# Patient Record
Sex: FEMALE | Race: WHITE | Employment: OTHER | URBAN - METROPOLITAN AREA
[De-identification: names, ages, dates, MRNs, and addresses within clinical notes are randomized per-mention and may not be internally consistent; named-entity substitution may affect disease eponyms.]

---

## 2017-01-23 PROBLEM — M48.061 SPINAL STENOSIS OF LUMBAR REGION: Status: ACTIVE | Noted: 2017-01-23

## 2017-01-23 PROBLEM — R10.9 LEFT FLANK PAIN: Status: ACTIVE | Noted: 2017-01-23

## 2017-01-23 PROBLEM — R10.2 SUPRAPUBIC DISCOMFORT: Status: ACTIVE | Noted: 2017-01-23

## 2017-01-24 PROCEDURE — 81001 URINALYSIS AUTO W/SCOPE: CPT | Performed by: FAMILY MEDICINE

## 2017-06-13 PROBLEM — R63.4 WEIGHT LOSS: Status: ACTIVE | Noted: 2017-06-13

## 2017-07-19 PROBLEM — M25.551 PAIN OF RIGHT HIP JOINT: Status: ACTIVE | Noted: 2017-07-19

## 2017-07-19 PROBLEM — S40.021A ARM BRUISE, RIGHT, INITIAL ENCOUNTER: Status: ACTIVE | Noted: 2017-07-19

## 2017-07-19 PROBLEM — R79.89 PRERENAL AZOTEMIA: Status: ACTIVE | Noted: 2017-07-19

## 2018-09-19 PROCEDURE — 81003 URINALYSIS AUTO W/O SCOPE: CPT | Performed by: FAMILY MEDICINE

## 2019-01-01 ENCOUNTER — APPOINTMENT (OUTPATIENT)
Dept: CT IMAGING | Facility: HOSPITAL | Age: 84
DRG: 813 | End: 2019-01-01
Attending: EMERGENCY MEDICINE
Payer: MEDICARE

## 2019-01-01 ENCOUNTER — HOSPITAL ENCOUNTER (OUTPATIENT)
Facility: HOSPITAL | Age: 84
Setting detail: OBSERVATION
Discharge: HOSPICE | End: 2019-01-01
Attending: EMERGENCY MEDICINE | Admitting: INTERNAL MEDICINE
Payer: MEDICARE

## 2019-01-01 ENCOUNTER — HOSPITAL ENCOUNTER (INPATIENT)
Facility: HOSPITAL | Age: 84
LOS: 12 days | Discharge: NURSING, REHAB OR LONG-TERM CARE | DRG: 813 | End: 2019-01-01
Attending: EMERGENCY MEDICINE | Admitting: HOSPITALIST
Payer: MEDICARE

## 2019-01-01 ENCOUNTER — HOSPITAL ENCOUNTER (INPATIENT)
Facility: HOSPITAL | Age: 84
LOS: 10 days | Discharge: HOSPICE | DRG: 813 | End: 2019-01-01
Attending: INTERNAL MEDICINE | Admitting: INTERNAL MEDICINE
Payer: OTHER MISCELLANEOUS

## 2019-01-01 VITALS
DIASTOLIC BLOOD PRESSURE: 65 MMHG | TEMPERATURE: 97 F | SYSTOLIC BLOOD PRESSURE: 124 MMHG | OXYGEN SATURATION: 98 % | RESPIRATION RATE: 18 BRPM | HEART RATE: 98 BPM

## 2019-01-01 VITALS
WEIGHT: 115 LBS | HEART RATE: 92 BPM | TEMPERATURE: 99 F | SYSTOLIC BLOOD PRESSURE: 119 MMHG | BODY MASS INDEX: 20.37 KG/M2 | DIASTOLIC BLOOD PRESSURE: 47 MMHG | OXYGEN SATURATION: 98 % | RESPIRATION RATE: 20 BRPM | HEIGHT: 63 IN

## 2019-01-01 VITALS
SYSTOLIC BLOOD PRESSURE: 110 MMHG | OXYGEN SATURATION: 99 % | BODY MASS INDEX: 20.58 KG/M2 | RESPIRATION RATE: 16 BRPM | HEIGHT: 61 IN | TEMPERATURE: 98 F | DIASTOLIC BLOOD PRESSURE: 48 MMHG | WEIGHT: 109 LBS | HEART RATE: 116 BPM

## 2019-01-01 DIAGNOSIS — D69.6 THROMBOCYTOPENIA (HCC): ICD-10-CM

## 2019-01-01 DIAGNOSIS — D69.6 THROMBOCYTOPENIA (HCC): Primary | ICD-10-CM

## 2019-01-01 DIAGNOSIS — D69.3 ACUTE ITP (HCC): Primary | ICD-10-CM

## 2019-01-01 LAB
ANION GAP SERPL CALC-SCNC: 10 MMOL/L (ref 0–18)
ANION GAP SERPL CALC-SCNC: 11 MMOL/L (ref 0–18)
ANION GAP SERPL CALC-SCNC: 6 MMOL/L (ref 0–18)
ANION GAP SERPL CALC-SCNC: 6 MMOL/L (ref 0–18)
ANION GAP SERPL CALC-SCNC: 7 MMOL/L (ref 0–18)
ANION GAP SERPL CALC-SCNC: 8 MMOL/L (ref 0–18)
ANION GAP SERPL CALC-SCNC: 8 MMOL/L (ref 0–18)
ANION GAP SERPL CALC-SCNC: 9 MMOL/L (ref 0–18)
ANTIBODY SCREEN: NEGATIVE
ANTIBODY SCREEN: POSITIVE
ANTIBODY SCREEN: POSITIVE
BACTERIA UR QL AUTO: NEGATIVE /HPF
BASOPHILS # BLD AUTO: 0.01 X10(3) UL (ref 0–0.2)
BASOPHILS # BLD AUTO: 0.03 X10(3) UL (ref 0–0.2)
BASOPHILS # BLD: 0 X10(3) UL (ref 0–0.2)
BASOPHILS NFR BLD AUTO: 0.1 %
BASOPHILS NFR BLD AUTO: 0.2 %
BASOPHILS NFR BLD: 0 %
BILIRUB UR QL: NEGATIVE
BLOOD TYPE BARCODE: 5100
BLOOD TYPE BARCODE: 6200
BLOOD TYPE BARCODE: 9500
BLOOD TYPE BARCODE: 9500
BUN BLD-MCNC: 24 MG/DL (ref 7–18)
BUN BLD-MCNC: 28 MG/DL (ref 7–18)
BUN BLD-MCNC: 31 MG/DL (ref 7–18)
BUN BLD-MCNC: 37 MG/DL (ref 7–18)
BUN BLD-MCNC: 42 MG/DL (ref 7–18)
BUN BLD-MCNC: 43 MG/DL (ref 7–18)
BUN BLD-MCNC: 43 MG/DL (ref 7–18)
BUN BLD-MCNC: 45 MG/DL (ref 7–18)
BUN BLD-MCNC: 47 MG/DL (ref 7–18)
BUN BLD-MCNC: 47 MG/DL (ref 7–18)
BUN BLD-MCNC: 54 MG/DL (ref 7–18)
BUN/CREAT SERPL: 24.2 (ref 10–20)
BUN/CREAT SERPL: 27.2 (ref 10–20)
BUN/CREAT SERPL: 31.6 (ref 10–20)
BUN/CREAT SERPL: 32.7 (ref 10–20)
BUN/CREAT SERPL: 40.8 (ref 10–20)
BUN/CREAT SERPL: 42.9 (ref 10–20)
BUN/CREAT SERPL: 43.9 (ref 10–20)
BUN/CREAT SERPL: 44.3 (ref 10–20)
BUN/CREAT SERPL: 44.3 (ref 10–20)
BUN/CREAT SERPL: 46.1 (ref 10–20)
BUN/CREAT SERPL: 48.6 (ref 10–20)
CALCIUM BLD-MCNC: 8.6 MG/DL (ref 8.5–10.1)
CALCIUM BLD-MCNC: 8.9 MG/DL (ref 8.5–10.1)
CALCIUM BLD-MCNC: 8.9 MG/DL (ref 8.5–10.1)
CALCIUM BLD-MCNC: 9 MG/DL (ref 8.5–10.1)
CALCIUM BLD-MCNC: 9.1 MG/DL (ref 8.5–10.1)
CALCIUM BLD-MCNC: 9.2 MG/DL (ref 8.5–10.1)
CALCIUM BLD-MCNC: 9.3 MG/DL (ref 8.5–10.1)
CALCIUM BLD-MCNC: 9.4 MG/DL (ref 8.5–10.1)
CALCIUM BLD-MCNC: 9.5 MG/DL (ref 8.5–10.1)
CHLORIDE SERPL-SCNC: 108 MMOL/L (ref 98–112)
CHLORIDE SERPL-SCNC: 109 MMOL/L (ref 98–112)
CHLORIDE SERPL-SCNC: 110 MMOL/L (ref 98–112)
CLARITY UR: CLEAR
CO2 SERPL-SCNC: 23 MMOL/L (ref 21–32)
CO2 SERPL-SCNC: 23 MMOL/L (ref 21–32)
CO2 SERPL-SCNC: 24 MMOL/L (ref 21–32)
CO2 SERPL-SCNC: 25 MMOL/L (ref 21–32)
CO2 SERPL-SCNC: 26 MMOL/L (ref 21–32)
CO2 SERPL-SCNC: 26 MMOL/L (ref 21–32)
CO2 SERPL-SCNC: 27 MMOL/L (ref 21–32)
COLOR UR: YELLOW
CREAT BLD-MCNC: 0.97 MG/DL (ref 0.55–1.02)
CREAT BLD-MCNC: 0.98 MG/DL (ref 0.55–1.02)
CREAT BLD-MCNC: 0.98 MG/DL (ref 0.55–1.02)
CREAT BLD-MCNC: 0.99 MG/DL (ref 0.55–1.02)
CREAT BLD-MCNC: 1.02 MG/DL (ref 0.55–1.02)
CREAT BLD-MCNC: 1.03 MG/DL (ref 0.55–1.02)
CREAT BLD-MCNC: 1.03 MG/DL (ref 0.55–1.02)
CREAT BLD-MCNC: 1.05 MG/DL (ref 0.55–1.02)
CREAT BLD-MCNC: 1.06 MG/DL (ref 0.55–1.02)
CREAT BLD-MCNC: 1.11 MG/DL (ref 0.55–1.02)
CREAT BLD-MCNC: 1.13 MG/DL (ref 0.55–1.02)
DAT (C3D): NEGATIVE
DAT (C3D): NEGATIVE
DAT (IGG): POSITIVE
DAT (IGG): POSITIVE
DEPRECATED RDW RBC AUTO: 45.9 FL (ref 35.1–46.3)
DEPRECATED RDW RBC AUTO: 46 FL (ref 35.1–46.3)
DEPRECATED RDW RBC AUTO: 46.5 FL (ref 35.1–46.3)
DEPRECATED RDW RBC AUTO: 46.9 FL (ref 35.1–46.3)
DEPRECATED RDW RBC AUTO: 55.6 FL (ref 35.1–46.3)
DEPRECATED RDW RBC AUTO: 55.7 FL (ref 35.1–46.3)
DEPRECATED RDW RBC AUTO: 56.8 FL (ref 35.1–46.3)
DEPRECATED RDW RBC AUTO: 56.9 FL (ref 35.1–46.3)
DEPRECATED RDW RBC AUTO: 57.1 FL (ref 35.1–46.3)
DEPRECATED RDW RBC AUTO: 57.5 FL (ref 35.1–46.3)
DEPRECATED RDW RBC AUTO: 57.5 FL (ref 35.1–46.3)
DEPRECATED RDW RBC AUTO: 57.6 FL (ref 35.1–46.3)
DEPRECATED RDW RBC AUTO: 58.3 FL (ref 35.1–46.3)
DEPRECATED RDW RBC AUTO: 58.5 FL (ref 35.1–46.3)
DEPRECATED RDW RBC AUTO: 59.9 FL (ref 35.1–46.3)
DEPRECATED RDW RBC AUTO: 66.3 FL (ref 35.1–46.3)
DEPRECATED RDW RBC AUTO: 68.2 FL (ref 35.1–46.3)
DIRECT COOMBS POLY: POSITIVE
DIRECT COOMBS POLY: POSITIVE
EOSINOPHIL # BLD AUTO: 0 X10(3) UL (ref 0–0.7)
EOSINOPHIL # BLD AUTO: 0.01 X10(3) UL (ref 0–0.7)
EOSINOPHIL # BLD AUTO: 0.01 X10(3) UL (ref 0–0.7)
EOSINOPHIL # BLD AUTO: 0.05 X10(3) UL (ref 0–0.7)
EOSINOPHIL # BLD: 0 X10(3) UL (ref 0–0.7)
EOSINOPHIL NFR BLD AUTO: 0 %
EOSINOPHIL NFR BLD AUTO: 0.1 %
EOSINOPHIL NFR BLD AUTO: 0.1 %
EOSINOPHIL NFR BLD AUTO: 0.6 %
EOSINOPHIL NFR BLD: 0 %
ERYTHROCYTE [DISTWIDTH] IN BLOOD BY AUTOMATED COUNT: 12.3 % (ref 11–15)
ERYTHROCYTE [DISTWIDTH] IN BLOOD BY AUTOMATED COUNT: 12.4 % (ref 11–15)
ERYTHROCYTE [DISTWIDTH] IN BLOOD BY AUTOMATED COUNT: 12.4 % (ref 11–15)
ERYTHROCYTE [DISTWIDTH] IN BLOOD BY AUTOMATED COUNT: 12.5 % (ref 11–15)
ERYTHROCYTE [DISTWIDTH] IN BLOOD BY AUTOMATED COUNT: 15.9 % (ref 11–15)
ERYTHROCYTE [DISTWIDTH] IN BLOOD BY AUTOMATED COUNT: 15.9 % (ref 11–15)
ERYTHROCYTE [DISTWIDTH] IN BLOOD BY AUTOMATED COUNT: 16 % (ref 11–15)
ERYTHROCYTE [DISTWIDTH] IN BLOOD BY AUTOMATED COUNT: 16.1 % (ref 11–15)
ERYTHROCYTE [DISTWIDTH] IN BLOOD BY AUTOMATED COUNT: 16.3 % (ref 11–15)
ERYTHROCYTE [DISTWIDTH] IN BLOOD BY AUTOMATED COUNT: 16.4 % (ref 11–15)
ERYTHROCYTE [DISTWIDTH] IN BLOOD BY AUTOMATED COUNT: 16.5 % (ref 11–15)
ERYTHROCYTE [DISTWIDTH] IN BLOOD BY AUTOMATED COUNT: 17.2 % (ref 11–15)
ERYTHROCYTE [DISTWIDTH] IN BLOOD BY AUTOMATED COUNT: 17.3 % (ref 11–15)
ERYTHROCYTE [DISTWIDTH] IN BLOOD BY AUTOMATED COUNT: 17.9 % (ref 11–15)
ERYTHROCYTE [DISTWIDTH] IN BLOOD BY AUTOMATED COUNT: 18.2 % (ref 11–15)
GLUCOSE BLD-MCNC: 122 MG/DL (ref 70–99)
GLUCOSE BLD-MCNC: 131 MG/DL (ref 70–99)
GLUCOSE BLD-MCNC: 132 MG/DL (ref 70–99)
GLUCOSE BLD-MCNC: 134 MG/DL (ref 70–99)
GLUCOSE BLD-MCNC: 134 MG/DL (ref 70–99)
GLUCOSE BLD-MCNC: 138 MG/DL (ref 70–99)
GLUCOSE BLD-MCNC: 140 MG/DL (ref 70–99)
GLUCOSE BLD-MCNC: 143 MG/DL (ref 70–99)
GLUCOSE BLD-MCNC: 152 MG/DL (ref 70–99)
GLUCOSE BLD-MCNC: 158 MG/DL (ref 70–99)
GLUCOSE BLD-MCNC: 96 MG/DL (ref 70–99)
GLUCOSE UR-MCNC: NEGATIVE MG/DL
HAPTOGLOB SERPL-MCNC: 177 MG/DL (ref 30–200)
HAPTOGLOB SERPL-MCNC: 190 MG/DL (ref 30–200)
HAV IGM SER QL: 2.3 MG/DL (ref 1.6–2.6)
HAV IGM SER QL: 2.4 MG/DL (ref 1.6–2.6)
HAV IGM SER QL: 2.5 MG/DL (ref 1.6–2.6)
HCT VFR BLD AUTO: 23 % (ref 35–48)
HCT VFR BLD AUTO: 23.3 % (ref 35–48)
HCT VFR BLD AUTO: 24.2 % (ref 35–48)
HCT VFR BLD AUTO: 24.4 % (ref 35–48)
HCT VFR BLD AUTO: 24.7 % (ref 35–48)
HCT VFR BLD AUTO: 24.7 % (ref 35–48)
HCT VFR BLD AUTO: 24.9 % (ref 35–48)
HCT VFR BLD AUTO: 25.2 % (ref 35–48)
HCT VFR BLD AUTO: 25.2 % (ref 35–48)
HCT VFR BLD AUTO: 25.6 % (ref 35–48)
HCT VFR BLD AUTO: 26 % (ref 35–48)
HCT VFR BLD AUTO: 26.5 % (ref 35–48)
HCT VFR BLD AUTO: 26.9 % (ref 35–48)
HCT VFR BLD AUTO: 27.1 % (ref 35–48)
HCT VFR BLD AUTO: 27.2 % (ref 35–48)
HCT VFR BLD AUTO: 27.4 % (ref 35–48)
HCT VFR BLD AUTO: 31.6 % (ref 35–48)
HGB BLD-MCNC: 10.6 G/DL (ref 12–16)
HGB BLD-MCNC: 7.4 G/DL (ref 12–16)
HGB BLD-MCNC: 7.6 G/DL (ref 12–16)
HGB BLD-MCNC: 7.8 G/DL (ref 12–16)
HGB BLD-MCNC: 7.9 G/DL (ref 12–16)
HGB BLD-MCNC: 8 G/DL (ref 12–16)
HGB BLD-MCNC: 8.2 G/DL (ref 12–16)
HGB BLD-MCNC: 8.2 G/DL (ref 12–16)
HGB BLD-MCNC: 8.3 G/DL (ref 12–16)
HGB BLD-MCNC: 8.3 G/DL (ref 12–16)
HGB BLD-MCNC: 8.4 G/DL (ref 12–16)
HGB BLD-MCNC: 8.5 G/DL (ref 12–16)
HGB BLD-MCNC: 8.7 G/DL (ref 12–16)
HGB BLD-MCNC: 8.9 G/DL (ref 12–16)
HGB BLD-MCNC: 9 G/DL (ref 12–16)
HGB UR QL STRIP.AUTO: NEGATIVE
IMM GRANULOCYTES # BLD AUTO: 0.05 X10(3) UL (ref 0–1)
IMM GRANULOCYTES # BLD AUTO: 0.05 X10(3) UL (ref 0–1)
IMM GRANULOCYTES # BLD AUTO: 0.09 X10(3) UL (ref 0–1)
IMM GRANULOCYTES # BLD AUTO: 0.14 X10(3) UL (ref 0–1)
IMM GRANULOCYTES # BLD AUTO: 0.25 X10(3) UL (ref 0–1)
IMM GRANULOCYTES # BLD AUTO: 0.37 X10(3) UL (ref 0–1)
IMM GRANULOCYTES NFR BLD: 0.6 %
IMM GRANULOCYTES NFR BLD: 0.7 %
IMM GRANULOCYTES NFR BLD: 0.9 %
IMM GRANULOCYTES NFR BLD: 1.4 %
IMM GRANULOCYTES NFR BLD: 1.6 %
IMM GRANULOCYTES NFR BLD: 2 %
KETONES UR-MCNC: NEGATIVE MG/DL
LDH SERPL L TO P-CCNC: 227 U/L (ref 84–246)
LDH SERPL L TO P-CCNC: 232 U/L (ref 84–246)
LYMPHOCYTES # BLD AUTO: 0.53 X10(3) UL (ref 1–4)
LYMPHOCYTES # BLD AUTO: 0.68 X10(3) UL (ref 1–4)
LYMPHOCYTES # BLD AUTO: 0.74 X10(3) UL (ref 1–4)
LYMPHOCYTES # BLD AUTO: 0.88 X10(3) UL (ref 1–4)
LYMPHOCYTES # BLD AUTO: 1.19 X10(3) UL (ref 1–4)
LYMPHOCYTES # BLD AUTO: 1.28 X10(3) UL (ref 1–4)
LYMPHOCYTES NFR BLD AUTO: 12.3 %
LYMPHOCYTES NFR BLD AUTO: 14.9 %
LYMPHOCYTES NFR BLD AUTO: 4.6 %
LYMPHOCYTES NFR BLD AUTO: 4.9 %
LYMPHOCYTES NFR BLD AUTO: 5.4 %
LYMPHOCYTES NFR BLD AUTO: 8.9 %
LYMPHOCYTES NFR BLD: 0.9 X10(3) UL (ref 1–4)
LYMPHOCYTES NFR BLD: 0.91 X10(3) UL (ref 1–4)
LYMPHOCYTES NFR BLD: 1.05 X10(3) UL (ref 1–4)
LYMPHOCYTES NFR BLD: 1.44 X10(3) UL (ref 1–4)
LYMPHOCYTES NFR BLD: 1.55 X10(3) UL (ref 1–4)
LYMPHOCYTES NFR BLD: 4 %
LYMPHOCYTES NFR BLD: 4 %
LYMPHOCYTES NFR BLD: 5 %
LYMPHOCYTES NFR BLD: 6 %
LYMPHOCYTES NFR BLD: 7 %
MCH RBC QN AUTO: 31.8 PG (ref 26–34)
MCH RBC QN AUTO: 31.9 PG (ref 26–34)
MCH RBC QN AUTO: 32 PG (ref 26–34)
MCH RBC QN AUTO: 32.2 PG (ref 26–34)
MCH RBC QN AUTO: 32.4 PG (ref 26–34)
MCH RBC QN AUTO: 32.5 PG (ref 26–34)
MCH RBC QN AUTO: 32.9 PG (ref 26–34)
MCH RBC QN AUTO: 33.2 PG (ref 26–34)
MCH RBC QN AUTO: 33.3 PG (ref 26–34)
MCH RBC QN AUTO: 33.3 PG (ref 26–34)
MCH RBC QN AUTO: 33.5 PG (ref 26–34)
MCH RBC QN AUTO: 33.5 PG (ref 26–34)
MCHC RBC AUTO-ENTMCNC: 31.4 G/DL (ref 31–37)
MCHC RBC AUTO-ENTMCNC: 32 G/DL (ref 31–37)
MCHC RBC AUTO-ENTMCNC: 32.2 G/DL (ref 31–37)
MCHC RBC AUTO-ENTMCNC: 32.3 G/DL (ref 31–37)
MCHC RBC AUTO-ENTMCNC: 32.4 G/DL (ref 31–37)
MCHC RBC AUTO-ENTMCNC: 32.4 G/DL (ref 31–37)
MCHC RBC AUTO-ENTMCNC: 32.5 G/DL (ref 31–37)
MCHC RBC AUTO-ENTMCNC: 32.8 G/DL (ref 31–37)
MCHC RBC AUTO-ENTMCNC: 32.8 G/DL (ref 31–37)
MCHC RBC AUTO-ENTMCNC: 32.9 G/DL (ref 31–37)
MCHC RBC AUTO-ENTMCNC: 32.9 G/DL (ref 31–37)
MCHC RBC AUTO-ENTMCNC: 33.1 G/DL (ref 31–37)
MCHC RBC AUTO-ENTMCNC: 33.1 G/DL (ref 31–37)
MCHC RBC AUTO-ENTMCNC: 33.5 G/DL (ref 31–37)
MCHC RBC AUTO-ENTMCNC: 33.5 G/DL (ref 31–37)
MCHC RBC AUTO-ENTMCNC: 33.6 G/DL (ref 31–37)
MCHC RBC AUTO-ENTMCNC: 33.7 G/DL (ref 31–37)
MCV RBC AUTO: 101.5 FL (ref 80–100)
MCV RBC AUTO: 101.6 FL (ref 80–100)
MCV RBC AUTO: 102.5 FL (ref 80–100)
MCV RBC AUTO: 102.5 FL (ref 80–100)
MCV RBC AUTO: 103.6 FL (ref 80–100)
MCV RBC AUTO: 104.1 FL (ref 80–100)
MCV RBC AUTO: 96.6 FL (ref 80–100)
MCV RBC AUTO: 96.8 FL (ref 80–100)
MCV RBC AUTO: 96.9 FL (ref 80–100)
MCV RBC AUTO: 97.1 FL (ref 80–100)
MCV RBC AUTO: 97.8 FL (ref 80–100)
MCV RBC AUTO: 98.1 FL (ref 80–100)
MCV RBC AUTO: 98.1 FL (ref 80–100)
MCV RBC AUTO: 98.2 FL (ref 80–100)
MCV RBC AUTO: 98.6 FL (ref 80–100)
MCV RBC AUTO: 98.8 FL (ref 80–100)
MCV RBC AUTO: 99.2 FL (ref 80–100)
METAMYELOCYTES # BLD: 0.53 X10(3) UL
METAMYELOCYTES NFR BLD: 2 %
MONOCYTES # BLD AUTO: 0.1 X10(3) UL (ref 0.1–1)
MONOCYTES # BLD AUTO: 0.35 X10(3) UL (ref 0.1–1)
MONOCYTES # BLD AUTO: 0.36 X10(3) UL (ref 0.1–1)
MONOCYTES # BLD AUTO: 0.53 X10(3) UL (ref 0.1–1)
MONOCYTES # BLD AUTO: 0.55 X10(3) UL (ref 0.1–1)
MONOCYTES # BLD AUTO: 0.69 X10(3) UL (ref 0.1–1)
MONOCYTES # BLD: 0.23 X10(3) UL (ref 0.1–1)
MONOCYTES # BLD: 0.54 X10(3) UL (ref 0.1–1)
MONOCYTES # BLD: 0.55 X10(3) UL (ref 0.1–1)
MONOCYTES # BLD: 1.04 X10(3) UL (ref 0.1–1)
MONOCYTES # BLD: 1.58 X10(3) UL (ref 0.1–1)
MONOCYTES NFR BLD AUTO: 1 %
MONOCYTES NFR BLD AUTO: 2 %
MONOCYTES NFR BLD AUTO: 3.4 %
MONOCYTES NFR BLD AUTO: 4.1 %
MONOCYTES NFR BLD AUTO: 6.9 %
MONOCYTES NFR BLD AUTO: 7.1 %
MONOCYTES NFR BLD: 1 %
MONOCYTES NFR BLD: 3 %
MONOCYTES NFR BLD: 3 %
MONOCYTES NFR BLD: 4 %
MONOCYTES NFR BLD: 6 %
MORPHOLOGY: NORMAL
MRSA DNA SPEC QL NAA+PROBE: NEGATIVE
MYELOCYTES # BLD: 0.26 X10(3) UL
MYELOCYTES NFR BLD: 1 %
NEUTROPHILS # BLD AUTO: 14.53 X10 (3) UL (ref 1.5–7.7)
NEUTROPHILS # BLD AUTO: 14.53 X10(3) UL (ref 1.5–7.7)
NEUTROPHILS # BLD AUTO: 15.5 X10 (3) UL (ref 1.5–7.7)
NEUTROPHILS # BLD AUTO: 15.61 X10 (3) UL (ref 1.5–7.7)
NEUTROPHILS # BLD AUTO: 16.42 X10 (3) UL (ref 1.5–7.7)
NEUTROPHILS # BLD AUTO: 16.42 X10(3) UL (ref 1.5–7.7)
NEUTROPHILS # BLD AUTO: 18.78 X10 (3) UL (ref 1.5–7.7)
NEUTROPHILS # BLD AUTO: 21.27 X10 (3) UL (ref 1.5–7.7)
NEUTROPHILS # BLD AUTO: 22.13 X10 (3) UL (ref 1.5–7.7)
NEUTROPHILS # BLD AUTO: 6.37 X10 (3) UL (ref 1.5–7.7)
NEUTROPHILS # BLD AUTO: 6.37 X10(3) UL (ref 1.5–7.7)
NEUTROPHILS # BLD AUTO: 6.84 X10 (3) UL (ref 1.5–7.7)
NEUTROPHILS # BLD AUTO: 6.84 X10(3) UL (ref 1.5–7.7)
NEUTROPHILS # BLD AUTO: 7.72 X10 (3) UL (ref 1.5–7.7)
NEUTROPHILS # BLD AUTO: 7.72 X10(3) UL (ref 1.5–7.7)
NEUTROPHILS # BLD AUTO: 9.08 X10 (3) UL (ref 1.5–7.7)
NEUTROPHILS # BLD AUTO: 9.08 X10(3) UL (ref 1.5–7.7)
NEUTROPHILS NFR BLD AUTO: 79.5 %
NEUTROPHILS NFR BLD AUTO: 79.7 %
NEUTROPHILS NFR BLD AUTO: 83.3 %
NEUTROPHILS NFR BLD AUTO: 90.3 %
NEUTROPHILS NFR BLD AUTO: 90.9 %
NEUTROPHILS NFR BLD AUTO: 92.1 %
NEUTROPHILS NFR BLD: 86 %
NEUTROPHILS NFR BLD: 87 %
NEUTROPHILS NFR BLD: 88 %
NEUTROPHILS NFR BLD: 91 %
NEUTROPHILS NFR BLD: 94 %
NEUTS BAND NFR BLD: 1 %
NEUTS BAND NFR BLD: 3 %
NEUTS HYPERSEG # BLD: 16.02 X10(3) UL (ref 1.5–7.7)
NEUTS HYPERSEG # BLD: 16.74 X10(3) UL (ref 1.5–7.7)
NEUTS HYPERSEG # BLD: 21.47 X10(3) UL (ref 1.5–7.7)
NEUTS HYPERSEG # BLD: 22.88 X10(3) UL (ref 1.5–7.7)
NEUTS HYPERSEG # BLD: 23.31 X10(3) UL (ref 1.5–7.7)
NITRITE UR QL STRIP.AUTO: NEGATIVE
OSMOLALITY SERPL CALC.SUM OF ELEC: 300 MOSM/KG (ref 275–295)
OSMOLALITY SERPL CALC.SUM OF ELEC: 300 MOSM/KG (ref 275–295)
OSMOLALITY SERPL CALC.SUM OF ELEC: 303 MOSM/KG (ref 275–295)
OSMOLALITY SERPL CALC.SUM OF ELEC: 303 MOSM/KG (ref 275–295)
OSMOLALITY SERPL CALC.SUM OF ELEC: 305 MOSM/KG (ref 275–295)
OSMOLALITY SERPL CALC.SUM OF ELEC: 307 MOSM/KG (ref 275–295)
OSMOLALITY SERPL CALC.SUM OF ELEC: 309 MOSM/KG (ref 275–295)
OSMOLALITY SERPL CALC.SUM OF ELEC: 310 MOSM/KG (ref 275–295)
OSMOLALITY SERPL CALC.SUM OF ELEC: 311 MOSM/KG (ref 275–295)
PH UR: 5 [PH] (ref 5–8)
PLATELET # BLD AUTO: 100 10(3)UL (ref 150–450)
PLATELET # BLD AUTO: 101 10(3)UL (ref 150–450)
PLATELET # BLD AUTO: 11 10(3)UL (ref 150–450)
PLATELET # BLD AUTO: 12 10(3)UL (ref 150–450)
PLATELET # BLD AUTO: 144 10(3)UL (ref 150–450)
PLATELET # BLD AUTO: 15 10(3)UL (ref 150–450)
PLATELET # BLD AUTO: 17 10(3)UL (ref 150–450)
PLATELET # BLD AUTO: 172 10(3)UL (ref 150–450)
PLATELET # BLD AUTO: 2 10(3)UL (ref 150–450)
PLATELET # BLD AUTO: 20 10(3)UL (ref 150–450)
PLATELET # BLD AUTO: 24 10(3)UL (ref 150–450)
PLATELET # BLD AUTO: 27 10(3)UL (ref 150–450)
PLATELET # BLD AUTO: 29 10(3)UL (ref 150–450)
PLATELET # BLD AUTO: 4 10(3)UL (ref 150–450)
PLATELET # BLD AUTO: 64 10(3)UL (ref 150–450)
PLATELET # BLD AUTO: 65 10(3)UL (ref 150–450)
PLATELET # BLD AUTO: 7 10(3)UL (ref 150–450)
PLATELET # BLD AUTO: 8 10(3)UL (ref 150–450)
PLATELET # BLD AUTO: <2 10(3)UL (ref 150–450)
PLATELET # BLD AUTO: <2 10(3)UL (ref 150–450)
PLATELET MORPHOLOGY: NORMAL
POTASSIUM SERPL-SCNC: 3.7 MMOL/L (ref 3.5–5.1)
POTASSIUM SERPL-SCNC: 3.7 MMOL/L (ref 3.5–5.1)
POTASSIUM SERPL-SCNC: 3.8 MMOL/L (ref 3.5–5.1)
POTASSIUM SERPL-SCNC: 3.9 MMOL/L (ref 3.5–5.1)
POTASSIUM SERPL-SCNC: 4.2 MMOL/L (ref 3.5–5.1)
POTASSIUM SERPL-SCNC: 4.2 MMOL/L (ref 3.5–5.1)
POTASSIUM SERPL-SCNC: 4.3 MMOL/L (ref 3.5–5.1)
POTASSIUM SERPL-SCNC: 4.3 MMOL/L (ref 3.5–5.1)
POTASSIUM SERPL-SCNC: 4.8 MMOL/L (ref 3.5–5.1)
PROT UR-MCNC: NEGATIVE MG/DL
RBC # BLD AUTO: 2.21 X10(6)UL (ref 3.8–5.3)
RBC # BLD AUTO: 2.36 X10(6)UL (ref 3.8–5.3)
RBC # BLD AUTO: 2.4 X10(6)UL (ref 3.8–5.3)
RBC # BLD AUTO: 2.41 X10(6)UL (ref 3.8–5.3)
RBC # BLD AUTO: 2.43 X10(6)UL (ref 3.8–5.3)
RBC # BLD AUTO: 2.51 X10(6)UL (ref 3.8–5.3)
RBC # BLD AUTO: 2.52 X10(6)UL (ref 3.8–5.3)
RBC # BLD AUTO: 2.55 X10(6)UL (ref 3.8–5.3)
RBC # BLD AUTO: 2.57 X10(6)UL (ref 3.8–5.3)
RBC # BLD AUTO: 2.58 X10(6)UL (ref 3.8–5.3)
RBC # BLD AUTO: 2.61 X10(6)UL (ref 3.8–5.3)
RBC # BLD AUTO: 2.67 X10(6)UL (ref 3.8–5.3)
RBC # BLD AUTO: 2.7 X10(6)UL (ref 3.8–5.3)
RBC # BLD AUTO: 2.74 X10(6)UL (ref 3.8–5.3)
RBC # BLD AUTO: 2.78 X10(6)UL (ref 3.8–5.3)
RBC # BLD AUTO: 2.78 X10(6)UL (ref 3.8–5.3)
RBC # BLD AUTO: 3.22 X10(6)UL (ref 3.8–5.3)
RBC #/AREA URNS AUTO: 2 /HPF
RH BLOOD TYPE: POSITIVE
SODIUM SERPL-SCNC: 141 MMOL/L (ref 136–145)
SODIUM SERPL-SCNC: 142 MMOL/L (ref 136–145)
SODIUM SERPL-SCNC: 143 MMOL/L (ref 136–145)
SODIUM SERPL-SCNC: 143 MMOL/L (ref 136–145)
SODIUM SERPL-SCNC: 144 MMOL/L (ref 136–145)
SP GR UR STRIP: 1.02 (ref 1–1.03)
TOTAL CELLS COUNTED: 100
UROBILINOGEN UR STRIP-ACNC: 4
VARIANT LYMPHS NFR BLD MANUAL: 1 %
VIT C UR-MCNC: NEGATIVE MG/DL
WBC # BLD AUTO: 14.8 X10(3) UL (ref 4–11)
WBC # BLD AUTO: 15.8 X10(3) UL (ref 4–11)
WBC # BLD AUTO: 15.8 X10(3) UL (ref 4–11)
WBC # BLD AUTO: 16.1 X10(3) UL (ref 4–11)
WBC # BLD AUTO: 16.1 X10(3) UL (ref 4–11)
WBC # BLD AUTO: 18 X10(3) UL (ref 4–11)
WBC # BLD AUTO: 18.1 X10(3) UL (ref 4–11)
WBC # BLD AUTO: 18.2 X10(3) UL (ref 4–11)
WBC # BLD AUTO: 22.6 X10(3) UL (ref 4–11)
WBC # BLD AUTO: 25.9 X10(3) UL (ref 4–11)
WBC # BLD AUTO: 26.3 X10(3) UL (ref 4–11)
WBC # BLD AUTO: 7.5 X10(3) UL (ref 4–11)
WBC # BLD AUTO: 7.7 X10(3) UL (ref 4–11)
WBC # BLD AUTO: 7.7 X10(3) UL (ref 4–11)
WBC # BLD AUTO: 8.6 X10(3) UL (ref 4–11)
WBC # BLD AUTO: 9.7 X10(3) UL (ref 4–11)
WBC # BLD AUTO: 9.9 X10(3) UL (ref 4–11)
WBC #/AREA URNS AUTO: 8 /HPF

## 2019-01-01 PROCEDURE — 96374 THER/PROPH/DIAG INJ IV PUSH: CPT

## 2019-01-01 PROCEDURE — 85027 COMPLETE CBC AUTOMATED: CPT | Performed by: INTERNAL MEDICINE

## 2019-01-01 PROCEDURE — 80048 BASIC METABOLIC PNL TOTAL CA: CPT | Performed by: HOSPITALIST

## 2019-01-01 PROCEDURE — 85027 COMPLETE CBC AUTOMATED: CPT | Performed by: HOSPITALIST

## 2019-01-01 PROCEDURE — 86850 RBC ANTIBODY SCREEN: CPT

## 2019-01-01 PROCEDURE — 97166 OT EVAL MOD COMPLEX 45 MIN: CPT

## 2019-01-01 PROCEDURE — 85025 COMPLETE CBC W/AUTO DIFF WBC: CPT | Performed by: INTERNAL MEDICINE

## 2019-01-01 PROCEDURE — 86900 BLOOD TYPING SEROLOGIC ABO: CPT | Performed by: EMERGENCY MEDICINE

## 2019-01-01 PROCEDURE — 85007 BL SMEAR W/DIFF WBC COUNT: CPT | Performed by: HOSPITALIST

## 2019-01-01 PROCEDURE — 85025 COMPLETE CBC W/AUTO DIFF WBC: CPT | Performed by: EMERGENCY MEDICINE

## 2019-01-01 PROCEDURE — 80048 BASIC METABOLIC PNL TOTAL CA: CPT | Performed by: INTERNAL MEDICINE

## 2019-01-01 PROCEDURE — 86880 COOMBS TEST DIRECT: CPT | Performed by: INTERNAL MEDICINE

## 2019-01-01 PROCEDURE — 36415 COLL VENOUS BLD VENIPUNCTURE: CPT

## 2019-01-01 PROCEDURE — 87641 MR-STAPH DNA AMP PROBE: CPT | Performed by: EMERGENCY MEDICINE

## 2019-01-01 PROCEDURE — 86901 BLOOD TYPING SEROLOGIC RH(D): CPT | Performed by: INTERNAL MEDICINE

## 2019-01-01 PROCEDURE — 99285 EMERGENCY DEPT VISIT HI MDM: CPT

## 2019-01-01 PROCEDURE — 85049 AUTOMATED PLATELET COUNT: CPT | Performed by: INTERNAL MEDICINE

## 2019-01-01 PROCEDURE — 85025 COMPLETE CBC W/AUTO DIFF WBC: CPT | Performed by: HOSPITALIST

## 2019-01-01 PROCEDURE — 86850 RBC ANTIBODY SCREEN: CPT | Performed by: EMERGENCY MEDICINE

## 2019-01-01 PROCEDURE — 36430 TRANSFUSION BLD/BLD COMPNT: CPT

## 2019-01-01 PROCEDURE — 81001 URINALYSIS AUTO W/SCOPE: CPT | Performed by: FAMILY MEDICINE

## 2019-01-01 PROCEDURE — 86077 PHYS BLOOD BANK SERV XMATCH: CPT | Performed by: INTERNAL MEDICINE

## 2019-01-01 PROCEDURE — 97535 SELF CARE MNGMENT TRAINING: CPT

## 2019-01-01 PROCEDURE — 86901 BLOOD TYPING SEROLOGIC RH(D): CPT | Performed by: EMERGENCY MEDICINE

## 2019-01-01 PROCEDURE — 80048 BASIC METABOLIC PNL TOTAL CA: CPT | Performed by: EMERGENCY MEDICINE

## 2019-01-01 PROCEDURE — 70450 CT HEAD/BRAIN W/O DYE: CPT | Performed by: EMERGENCY MEDICINE

## 2019-01-01 PROCEDURE — 86850 RBC ANTIBODY SCREEN: CPT | Performed by: INTERNAL MEDICINE

## 2019-01-01 PROCEDURE — 85049 AUTOMATED PLATELET COUNT: CPT | Performed by: HOSPITALIST

## 2019-01-01 PROCEDURE — 97530 THERAPEUTIC ACTIVITIES: CPT

## 2019-01-01 PROCEDURE — 83615 LACTATE (LD) (LDH) ENZYME: CPT | Performed by: INTERNAL MEDICINE

## 2019-01-01 PROCEDURE — 81001 URINALYSIS AUTO W/SCOPE: CPT | Performed by: HOSPITALIST

## 2019-01-01 PROCEDURE — 99211 OFF/OP EST MAY X REQ PHY/QHP: CPT

## 2019-01-01 PROCEDURE — 97162 PT EVAL MOD COMPLEX 30 MIN: CPT

## 2019-01-01 PROCEDURE — 86880 COOMBS TEST DIRECT: CPT | Performed by: EMERGENCY MEDICINE

## 2019-01-01 PROCEDURE — 87086 URINE CULTURE/COLONY COUNT: CPT | Performed by: HOSPITALIST

## 2019-01-01 PROCEDURE — 86900 BLOOD TYPING SEROLOGIC ABO: CPT

## 2019-01-01 PROCEDURE — 30233R1 TRANSFUSION OF NONAUTOLOGOUS PLATELETS INTO PERIPHERAL VEIN, PERCUTANEOUS APPROACH: ICD-10-PCS | Performed by: HOSPITALIST

## 2019-01-01 PROCEDURE — 86920 COMPATIBILITY TEST SPIN: CPT

## 2019-01-01 PROCEDURE — 83735 ASSAY OF MAGNESIUM: CPT | Performed by: HOSPITALIST

## 2019-01-01 PROCEDURE — 86901 BLOOD TYPING SEROLOGIC RH(D): CPT

## 2019-01-01 PROCEDURE — 86077 PHYS BLOOD BANK SERV XMATCH: CPT | Performed by: EMERGENCY MEDICINE

## 2019-01-01 PROCEDURE — 83010 ASSAY OF HAPTOGLOBIN QUANT: CPT | Performed by: INTERNAL MEDICINE

## 2019-01-01 PROCEDURE — 86870 RBC ANTIBODY IDENTIFICATION: CPT | Performed by: INTERNAL MEDICINE

## 2019-01-01 PROCEDURE — 86870 RBC ANTIBODY IDENTIFICATION: CPT | Performed by: EMERGENCY MEDICINE

## 2019-01-01 PROCEDURE — 87086 URINE CULTURE/COLONY COUNT: CPT | Performed by: FAMILY MEDICINE

## 2019-01-01 PROCEDURE — 86900 BLOOD TYPING SEROLOGIC ABO: CPT | Performed by: INTERNAL MEDICINE

## 2019-01-01 RX ORDER — LORAZEPAM 0.5 MG/1
0.25 TABLET ORAL 2 TIMES DAILY
Status: DISCONTINUED | OUTPATIENT
Start: 2019-01-01 | End: 2019-01-01

## 2019-01-01 RX ORDER — POLYETHYLENE GLYCOL 3350 17 G/17G
17 POWDER, FOR SOLUTION ORAL DAILY PRN
Status: DISCONTINUED | OUTPATIENT
Start: 2019-01-01 | End: 2019-01-01

## 2019-01-01 RX ORDER — ACETAMINOPHEN 160 MG/5ML
650 SOLUTION ORAL EVERY 4 HOURS PRN
Status: DISCONTINUED | OUTPATIENT
Start: 2019-01-01 | End: 2019-01-01

## 2019-01-01 RX ORDER — DOCUSATE SODIUM 100 MG/1
100 CAPSULE, LIQUID FILLED ORAL 2 TIMES DAILY
Status: DISCONTINUED | OUTPATIENT
Start: 2019-01-01 | End: 2019-01-01

## 2019-01-01 RX ORDER — DIPHENHYDRAMINE HCL 25 MG
25 CAPSULE ORAL EVERY 4 HOURS PRN
Status: DISCONTINUED | OUTPATIENT
Start: 2019-01-01 | End: 2019-01-01

## 2019-01-01 RX ORDER — ACETAMINOPHEN 325 MG/1
650 TABLET ORAL EVERY 4 HOURS PRN
Status: DISCONTINUED | OUTPATIENT
Start: 2019-01-01 | End: 2019-01-01

## 2019-01-01 RX ORDER — QUETIAPINE 25 MG/1
12.5 TABLET, FILM COATED ORAL NIGHTLY
Status: DISCONTINUED | OUTPATIENT
Start: 2019-01-01 | End: 2019-01-01

## 2019-01-01 RX ORDER — BISACODYL 10 MG
10 SUPPOSITORY, RECTAL RECTAL
Status: DISCONTINUED | OUTPATIENT
Start: 2019-01-01 | End: 2019-01-01

## 2019-01-01 RX ORDER — LORAZEPAM 2 MG/ML
0.25 INJECTION INTRAMUSCULAR 2 TIMES DAILY
Status: DISCONTINUED | OUTPATIENT
Start: 2019-01-01 | End: 2019-01-01

## 2019-01-01 RX ORDER — ACETAMINOPHEN 325 MG/1
650 TABLET ORAL WEEKLY
Status: DISCONTINUED | OUTPATIENT
Start: 2019-01-01 | End: 2019-01-01

## 2019-01-01 RX ORDER — SODIUM CHLORIDE 0.9 % (FLUSH) 0.9 %
3 SYRINGE (ML) INJECTION AS NEEDED
Status: DISCONTINUED | OUTPATIENT
Start: 2019-01-01 | End: 2019-01-01

## 2019-01-01 RX ORDER — SODIUM CHLORIDE 9 MG/ML
INJECTION, SOLUTION INTRAVENOUS ONCE
Status: COMPLETED | OUTPATIENT
Start: 2019-01-01 | End: 2019-01-01

## 2019-01-01 RX ORDER — LORAZEPAM 2 MG/ML
0.5 INJECTION INTRAMUSCULAR EVERY 4 HOURS PRN
Status: DISCONTINUED | OUTPATIENT
Start: 2019-01-01 | End: 2019-01-01

## 2019-01-01 RX ORDER — METOCLOPRAMIDE HYDROCHLORIDE 5 MG/ML
5 INJECTION INTRAMUSCULAR; INTRAVENOUS EVERY 8 HOURS PRN
Status: DISCONTINUED | OUTPATIENT
Start: 2019-01-01 | End: 2019-01-01

## 2019-01-01 RX ORDER — ECHINACEA PURPUREA EXTRACT 125 MG
1 TABLET ORAL
Status: DISCONTINUED | OUTPATIENT
Start: 2019-01-01 | End: 2019-01-01

## 2019-01-01 RX ORDER — ONDANSETRON 4 MG/1
4 TABLET, ORALLY DISINTEGRATING ORAL EVERY 6 HOURS PRN
Status: DISCONTINUED | OUTPATIENT
Start: 2019-01-01 | End: 2019-01-01

## 2019-01-01 RX ORDER — METOCLOPRAMIDE 10 MG/1
10 TABLET ORAL EVERY 6 HOURS PRN
Status: DISCONTINUED | OUTPATIENT
Start: 2019-01-01 | End: 2019-01-01

## 2019-01-01 RX ORDER — ONDANSETRON 2 MG/ML
4 INJECTION INTRAMUSCULAR; INTRAVENOUS EVERY 6 HOURS PRN
Status: DISCONTINUED | OUTPATIENT
Start: 2019-01-01 | End: 2019-01-01

## 2019-01-01 RX ORDER — METOCLOPRAMIDE HYDROCHLORIDE 5 MG/ML
10 INJECTION INTRAMUSCULAR; INTRAVENOUS EVERY 6 HOURS PRN
Status: DISCONTINUED | OUTPATIENT
Start: 2019-01-01 | End: 2019-01-01

## 2019-01-01 RX ORDER — DIPHENHYDRAMINE HCL 50 MG
50 CAPSULE ORAL WEEKLY
Status: DISCONTINUED | OUTPATIENT
Start: 2019-01-01 | End: 2019-01-01

## 2019-01-01 RX ORDER — TRAMADOL HYDROCHLORIDE 50 MG/1
50 TABLET ORAL EVERY 6 HOURS PRN
Status: DISCONTINUED | OUTPATIENT
Start: 2019-01-01 | End: 2019-01-01

## 2019-01-01 RX ORDER — TRAMADOL HYDROCHLORIDE 50 MG/1
50 TABLET ORAL EVERY 12 HOURS PRN
Status: DISCONTINUED | OUTPATIENT
Start: 2019-01-01 | End: 2019-01-01

## 2019-01-01 RX ORDER — MORPHINE SULFATE 2 MG/ML
1 INJECTION, SOLUTION INTRAMUSCULAR; INTRAVENOUS
Status: DISCONTINUED | OUTPATIENT
Start: 2019-01-01 | End: 2019-01-01

## 2019-01-01 RX ORDER — QUETIAPINE 25 MG/1
25 TABLET, FILM COATED ORAL NIGHTLY
Status: DISCONTINUED | OUTPATIENT
Start: 2019-01-01 | End: 2019-01-01

## 2019-01-01 RX ORDER — ACETAMINOPHEN 650 MG/1
650 SUPPOSITORY RECTAL EVERY 4 HOURS PRN
Status: DISCONTINUED | OUTPATIENT
Start: 2019-01-01 | End: 2019-01-01

## 2019-01-01 RX ORDER — SODIUM CHLORIDE 0.9 % (FLUSH) 0.9 %
10 SYRINGE (ML) INJECTION AS NEEDED
Status: DISCONTINUED | OUTPATIENT
Start: 2019-01-01 | End: 2019-01-01

## 2019-01-01 RX ORDER — LORAZEPAM 2 MG/ML
2 INJECTION INTRAMUSCULAR EVERY 4 HOURS PRN
Status: DISCONTINUED | OUTPATIENT
Start: 2019-01-01 | End: 2019-01-01

## 2019-01-01 RX ORDER — METHYLPREDNISOLONE SODIUM SUCCINATE 125 MG/2ML
1 INJECTION, POWDER, LYOPHILIZED, FOR SOLUTION INTRAMUSCULAR; INTRAVENOUS ONCE
Status: COMPLETED | OUTPATIENT
Start: 2019-01-01 | End: 2019-01-01

## 2019-01-01 RX ORDER — METHYLPREDNISOLONE SODIUM SUCCINATE 125 MG/2ML
60 INJECTION, POWDER, LYOPHILIZED, FOR SOLUTION INTRAMUSCULAR; INTRAVENOUS DAILY
Status: DISCONTINUED | OUTPATIENT
Start: 2019-01-01 | End: 2019-01-01 | Stop reason: ALTCHOICE

## 2019-01-01 RX ORDER — DEXAMETHASONE SODIUM PHOSPHATE 10 MG/ML
40 INJECTION, SOLUTION INTRAMUSCULAR; INTRAVENOUS DAILY
Status: DISCONTINUED | OUTPATIENT
Start: 2019-01-01 | End: 2019-01-01

## 2019-01-01 RX ORDER — PANTOPRAZOLE SODIUM 40 MG/1
40 TABLET, DELAYED RELEASE ORAL
Status: DISCONTINUED | OUTPATIENT
Start: 2019-01-01 | End: 2019-01-01

## 2019-01-01 RX ORDER — FAMOTIDINE 10 MG
10 TABLET ORAL AS NEEDED
Status: DISCONTINUED | OUTPATIENT
Start: 2019-01-01 | End: 2019-01-01

## 2019-01-01 RX ORDER — LORAZEPAM 2 MG/ML
1 INJECTION INTRAMUSCULAR EVERY 4 HOURS PRN
Status: DISCONTINUED | OUTPATIENT
Start: 2019-01-01 | End: 2019-01-01

## 2019-01-01 RX ORDER — LEVOTHYROXINE SODIUM 0.03 MG/1
25 TABLET ORAL
Status: DISCONTINUED | OUTPATIENT
Start: 2019-01-01 | End: 2019-01-01

## 2019-01-01 RX ORDER — PROCHLORPERAZINE MALEATE 10 MG
10 TABLET ORAL EVERY 4 HOURS PRN
Status: DISCONTINUED | OUTPATIENT
Start: 2019-01-01 | End: 2019-01-01

## 2019-01-01 RX ORDER — ACETAMINOPHEN 325 MG/1
650 TABLET ORAL EVERY 6 HOURS PRN
Status: DISCONTINUED | OUTPATIENT
Start: 2019-01-01 | End: 2019-01-01

## 2019-01-01 RX ORDER — LORAZEPAM 2 MG/ML
0.5 INJECTION INTRAMUSCULAR EVERY 6 HOURS PRN
Status: DISCONTINUED | OUTPATIENT
Start: 2019-01-01 | End: 2019-01-01

## 2019-01-01 RX ORDER — LORAZEPAM 2 MG/ML
0.5 CONCENTRATE ORAL EVERY 8 HOURS PRN
Status: DISCONTINUED | OUTPATIENT
Start: 2019-01-01 | End: 2019-01-01

## 2019-01-01 RX ORDER — LORAZEPAM 2 MG/ML
0.5 INJECTION INTRAMUSCULAR 2 TIMES DAILY
Status: DISCONTINUED | OUTPATIENT
Start: 2019-01-01 | End: 2019-01-01

## 2019-01-01 RX ORDER — SODIUM CHLORIDE 0.9 % (FLUSH) 0.9 %
3 SYRINGE (ML) INJECTION AS NEEDED
Status: CANCELLED | OUTPATIENT
Start: 2019-01-01

## 2019-01-01 RX ORDER — METOCLOPRAMIDE HYDROCHLORIDE 5 MG/ML
10 INJECTION INTRAMUSCULAR; INTRAVENOUS EVERY 8 HOURS PRN
Status: DISCONTINUED | OUTPATIENT
Start: 2019-01-01 | End: 2019-01-01

## 2019-01-01 RX ORDER — LEVOTHYROXINE SODIUM 0.03 MG/1
25 TABLET ORAL SEE ADMIN INSTRUCTIONS
Status: ON HOLD | COMMUNITY
End: 2019-01-01

## 2019-01-01 RX ORDER — POTASSIUM CHLORIDE 20 MEQ/1
40 TABLET, EXTENDED RELEASE ORAL ONCE
Status: COMPLETED | OUTPATIENT
Start: 2019-01-01 | End: 2019-01-01

## 2019-01-01 RX ORDER — PROCHLORPERAZINE 25 MG
25 SUPPOSITORY, RECTAL RECTAL EVERY 6 HOURS PRN
Status: DISCONTINUED | OUTPATIENT
Start: 2019-01-01 | End: 2019-01-01

## 2019-01-01 RX ORDER — SODIUM CHLORIDE 9 MG/ML
INJECTION, SOLUTION INTRAVENOUS ONCE
Status: DISCONTINUED | OUTPATIENT
Start: 2019-01-01 | End: 2019-01-01

## 2019-01-01 RX ORDER — GLYCOPYRROLATE 0.2 MG/ML
0.2 INJECTION, SOLUTION INTRAMUSCULAR; INTRAVENOUS
Status: DISCONTINUED | OUTPATIENT
Start: 2019-01-01 | End: 2019-01-01

## 2019-01-01 RX ORDER — PANTOPRAZOLE SODIUM 20 MG/1
20 TABLET, DELAYED RELEASE ORAL
Status: DISCONTINUED | OUTPATIENT
Start: 2019-01-01 | End: 2019-01-01

## 2019-01-01 RX ORDER — ACETAMINOPHEN 325 MG/1
650 TABLET ORAL EVERY 4 HOURS PRN
Status: CANCELLED | OUTPATIENT
Start: 2019-01-01

## 2019-01-01 RX ORDER — SENNOSIDES 8.6 MG
8.6 TABLET ORAL 2 TIMES DAILY
Status: DISCONTINUED | OUTPATIENT
Start: 2019-01-01 | End: 2019-01-01

## 2019-01-01 RX ORDER — ATROPINE SULFATE 10 MG/ML
2 SOLUTION/ DROPS OPHTHALMIC EVERY 2 HOUR PRN
Status: DISCONTINUED | OUTPATIENT
Start: 2019-01-01 | End: 2019-01-01

## 2019-01-01 RX ORDER — ONDANSETRON 4 MG/1
4 TABLET, ORALLY DISINTEGRATING ORAL EVERY 6 HOURS PRN
Status: ON HOLD | COMMUNITY
End: 2019-01-01

## 2019-01-01 RX ORDER — FUROSEMIDE 10 MG/ML
40 INJECTION INTRAMUSCULAR; INTRAVENOUS EVERY 8 HOURS PRN
Status: DISCONTINUED | OUTPATIENT
Start: 2019-01-01 | End: 2019-01-01

## 2019-01-01 RX ORDER — METOCLOPRAMIDE HYDROCHLORIDE 5 MG/ML
5 INJECTION INTRAMUSCULAR; INTRAVENOUS EVERY 6 HOURS PRN
Status: DISCONTINUED | OUTPATIENT
Start: 2019-01-01 | End: 2019-01-01

## 2019-01-01 RX ORDER — GLYCOPYRROLATE 0.2 MG/ML
0.4 INJECTION, SOLUTION INTRAMUSCULAR; INTRAVENOUS
Status: DISCONTINUED | OUTPATIENT
Start: 2019-01-01 | End: 2019-01-01

## 2019-01-01 RX ORDER — ACETAMINOPHEN 160 MG/5ML
650 SOLUTION ORAL EVERY 6 HOURS SCHEDULED
Status: DISCONTINUED | OUTPATIENT
Start: 2019-01-01 | End: 2019-01-01

## 2019-01-01 RX ORDER — DEXAMETHASONE 2 MG/1
TABLET ORAL
Qty: 11 TABLET | Refills: 0 | Status: SHIPPED | OUTPATIENT
Start: 2019-01-01 | End: 2019-01-01

## 2019-01-01 RX ORDER — HALOPERIDOL 5 MG/ML
2 INJECTION INTRAMUSCULAR
Status: DISCONTINUED | OUTPATIENT
Start: 2019-01-01 | End: 2019-01-01

## 2019-01-01 RX ORDER — SCOLOPAMINE TRANSDERMAL SYSTEM 1 MG/1
1 PATCH, EXTENDED RELEASE TRANSDERMAL
Status: DISCONTINUED | OUTPATIENT
Start: 2019-01-01 | End: 2019-01-01

## 2019-01-01 RX ORDER — HALOPERIDOL 5 MG/ML
1 INJECTION INTRAMUSCULAR
Status: DISCONTINUED | OUTPATIENT
Start: 2019-01-01 | End: 2019-01-01

## 2019-01-01 RX ORDER — ACETAMINOPHEN 500 MG
1000 TABLET ORAL ONCE
Status: COMPLETED | OUTPATIENT
Start: 2019-01-01 | End: 2019-01-01

## 2019-01-01 RX ORDER — MORPHINE SULFATE 20 MG/ML
2.5 SOLUTION ORAL
Status: DISCONTINUED | OUTPATIENT
Start: 2019-01-01 | End: 2019-01-01

## 2019-01-01 RX ORDER — MORPHINE SULFATE 20 MG/ML
5 SOLUTION ORAL
Status: DISCONTINUED | OUTPATIENT
Start: 2019-01-01 | End: 2019-01-01

## 2019-01-01 RX ORDER — LORAZEPAM 2 MG/ML
0.5 CONCENTRATE ORAL EVERY 4 HOURS PRN
Status: DISCONTINUED | OUTPATIENT
Start: 2019-01-01 | End: 2019-01-01

## 2019-01-01 RX ORDER — BISACODYL 10 MG
10 SUPPOSITORY, RECTAL RECTAL
Status: CANCELLED | OUTPATIENT
Start: 2019-01-01

## 2019-01-01 RX ORDER — OMEPRAZOLE 20 MG/1
20 CAPSULE, DELAYED RELEASE ORAL
Status: ON HOLD | COMMUNITY
End: 2019-01-01

## 2019-01-01 RX ORDER — FUROSEMIDE 40 MG/1
40 TABLET ORAL EVERY 8 HOURS PRN
Status: DISCONTINUED | OUTPATIENT
Start: 2019-01-01 | End: 2019-01-01

## 2019-01-01 RX ORDER — ONDANSETRON 2 MG/ML
4 INJECTION INTRAMUSCULAR; INTRAVENOUS EVERY 4 HOURS PRN
Status: DISCONTINUED | OUTPATIENT
Start: 2019-01-01 | End: 2019-01-01

## 2019-01-01 RX ORDER — ACETAMINOPHEN 650 MG/1
650 SUPPOSITORY RECTAL EVERY 6 HOURS SCHEDULED
Status: DISCONTINUED | OUTPATIENT
Start: 2019-01-01 | End: 2019-01-01

## 2019-08-02 PROBLEM — D69.6 THROMBOCYTOPENIA (HCC): Status: ACTIVE | Noted: 2019-01-01

## 2019-08-02 PROBLEM — D69.3 ACUTE ITP (HCC): Status: ACTIVE | Noted: 2019-01-01

## 2019-08-02 NOTE — ED NOTES
Spoke with blood bank who stated that second unit of platelets will not be available for 2-3 hours. Dr. Chavez Other notified.

## 2019-08-02 NOTE — H&P
[facility] Hospitalist H&P       CC: Patient presents with:  GI Bleeding (gastrointestinal)       PCP: Pauly Hess MD    Date of Admission: 8/2/2019  2:24 PM    ASSESSMENT / PLAN:     Ms. Ora Cowden is a 81 yo F with PMH of ITP who presented from PCP office wit hospital but it was many years ago. In the ED, PLT count 11. CT brain without bleeding. Received 1 unit PLT in ED, after completion, did complain of some funny feeling which daughter in law states has happened with prior PLT transfusions.  No rash or diffic RDW 12.4   WBC 8.62   PLT 11*         Recent Labs   Lab 08/02/19  1151   *   BUN 29.0*   CREATSERUM 1.25*   CA 9.9      K 4.10      CO2 24.0          No results for input(s): TROP in the last 168 hours.     Additional Diagnostics:

## 2019-08-02 NOTE — ED INITIAL ASSESSMENT (HPI)
Rectal bleeding and bleeding from gums x1 week. Unsure if she is on a blood thinner. Sent by  for low platelets.

## 2019-08-02 NOTE — ED PROVIDER NOTES
Patient Seen in: [facility] AND [facility] Emergency Department    History   Patient presents with:  GI Bleeding (gastrointestinal)      HPI    Patient presents to the ED complaining of bleeding from her gums for the past week and a mild amount of blood mixed wi Eyes: Conjunctivae are normal. Right eye exhibits no discharge. Left eye exhibits no discharge. Neck: No tracheal deviation present. Cardiovascular: Normal rate and intact distal pulses. Pulmonary/Chest: Effort normal. No stridor.  No respiratory di 144/67 (!) 163/77   Pulse: 80 80 100 65   Resp: 19 17 16 20   Temp:  98.5 °F (36.9 °C) 98.5 °F (36.9 °C)    TempSrc:  Temporal Temporal    SpO2: 99% 99% 95% 100%   Weight:       Height:         *I personally reviewed and interpreted all ED vitals.     Pulse

## 2019-08-02 NOTE — ED NOTES
Pt presents for c/o low platelets s/p visit to PCP for c/o petechia to bilateral lower legs, bleeding ot mouth, and bright red blood per rectum for past 5 days. Pt with hx ITP. Denies current blood thinner use.

## 2019-08-03 NOTE — PLAN OF CARE
Problem: Patient Centered Care  Goal: Patient preferences are identified and integrated in the patient's plan of care  Description  Interventions:  - What would you like us to know  - Provide timely, complete, and accurate information to patient/family interventions, skin care algorithm/standards of care as needed  Outcome: Progressing     Problem: HEMATOLOGIC - ADULT  Goal: Maintains hematologic stability  Description  INTERVENTIONS  - Assess for signs and symptoms of bleeding or hemorrhage  - Monitor l

## 2019-08-03 NOTE — CONSULTS
Hematology/Oncology Consult Note        NAME: Yoanna Varela - ROOM: 030/870-D - MRN: [id number] - Age: 80year old - : 10/19/1924    Reason for Consult:  Acute ITP     Patient is a 80 y.o female who presents on this admission with dark stools.  She has 54*   GFRNAA  --  47*   CA 9.9 9.3    144   K 4.10 3.7    108   CO2 24.0 25.0       Imaging:       Assessment/Plan:  Shankar Story is a 80 y.o female with history of ITP with ITP     ITP  - previously responded to steroids in the past   - plts

## 2019-08-03 NOTE — CM/SW NOTE
AMIE received for POLST. SW requested RN to place POLST form on pt's chart - will need all signatures. Per RN, pt is from home w/ a caregiver who comes daily for 4 hours. PT/OT requested. SW will await for further recommendations and will f/u as needed.

## 2019-08-03 NOTE — PLAN OF CARE
Patient admitted with low platelets, 2 units of platelets infused in ED. Bleeding precautions maintained. Patient had 1 small bloody BM. Up with 1 assist and cane to bathroom, voiding freely. Tolerating diet. Plan on going. Will continue to monitor.      Pr SKIN/TISSUE INTEGRITY - ADULT  Goal: Skin integrity remains intact  Description  INTERVENTIONS  - Assess and document risk factors for pressure ulcer development  - Assess and document skin integrity  - Monitor for areas of redness and/or skin breakdown  -

## 2019-08-03 NOTE — PROGRESS NOTES
[facility] Hospitalist Progress Note     CC: Hospital Follow up    PCP: Juan Zarco MD       Assessment/Plan:     Principal Problem:    Acute ITP ([facility] [address]  Active Problems:     Thrombocytopenia [facility])    Ms. Rei Huff is a 79 yo F with PMH of ITP who presented from P 147/67      Intake/Output:    Intake/Output Summary (Last 24 hours) at 8/3/2019 1441  Last data filed at 8/3/2019 1333  Gross per 24 hour   Intake 733 ml   Output —   Net 733 ml       Last 3 Weights  08/03/19 0424 : 105 lb 8 oz (47.9 kg)  08/02/19 1308 : 1

## 2019-08-03 NOTE — PROGRESS NOTES
[facility] Pharmacy Note:  Renal Dose Adjustment for Metoclopramide (REGLAN)    Sunni Gay has been prescribed Metoclopramide (REGLAN) 10 mg every 8 hours as needed for nausea,vomiting.     Estimated Creatinine Clearance: 25.3 mL/min (A) (based on SCr of 1.03

## 2019-08-04 NOTE — PROGRESS NOTES
[facility] Hospitalist Progress Note     CC: Hospital Follow up    PCP: Griselda Gustafson MD       Assessment/Plan:     Principal Problem:    Acute ITP ([facility] [address]  Active Problems:     Thrombocytopenia [facility])    Ms. Cassi Brennan is a 81 yo F with PMH of ITP who presented from P [18-24] 18  BP: (110-149)/(45-79) 126/45      Intake/Output:    Intake/Output Summary (Last 24 hours) at 8/4/2019 1438  Last data filed at 8/4/2019 1126  Gross per 24 hour   Intake 1364.58 ml   Output —   Net 1364.58 ml       Last 3 Weights  08/04/19 0500 (CST): Angela Navarro MD on 8/02/2019 at 15:46              Meds:     • sodium chloride   Intravenous Once   • dexamethasone  40 mg Oral Daily       Normal Saline Flush, Normal Saline Flush, Normal Saline Flush, Metoclopramide HCl, Normal Saline Flush, ac

## 2019-08-04 NOTE — PROGRESS NOTES
08/03/19 2000   Clinical Encounter Type   Visited With Patient and family together;Health care provider   Routine Visit Introduction   Continue Visiting Yes   Referral From Nurse   Referral To      Pt is in bed, Pt’s daughter is at bedside.  Pt i

## 2019-08-04 NOTE — PLAN OF CARE
Platelets still low s/p transfusions. Additional platelets ordered and patient will receive another dose if IV IG in AM followed by 1 unit of platelets. No bloody BMs noted overnight. Bleeding precautions maintained.  Up with 1 assist and cane to bathroom, toileting schedule  Outcome: Progressing     Problem: SKIN/TISSUE INTEGRITY - ADULT  Goal: Skin integrity remains intact  Description  INTERVENTIONS  - Assess and document risk factors for pressure ulcer development  - Assess and document skin integrity  -

## 2019-08-04 NOTE — PROGRESS NOTES
Hematology/Oncology follow up Note      S/p IVIG yesterday plts 8 last night  CBC pending this am    Past Medical History:   Diagnosis Date   • Scoliosis     History reviewed. No pertinent surgical history. Family History   Family history unknown:  Yes no response   - steroid refractory--> gave 1 dose of IVIG yesterday  - with platelets at 8 last night and 1 more unit of platelets given overnight  - await CBC this am  - next dose of IVIG due today  - if minimal response, will increase steroid dose  - may

## 2019-08-05 NOTE — PLAN OF CARE
Tolerating diet, IV IG given, 1 unit platelets given, awaiting repeat cbc,  voiding, up with assist and walker,family at bedside.    Problem: Patient Centered Care  Goal: Patient preferences are identified and integrated in the patient's plan of care  Descr reduce risk of injury  - Provide assistive devices as appropriate  - Consider OT/PT consult to assist with strengthening/mobility  - Encourage toileting schedule  8/4/2019 1948 by Nenita Espinoza RN  Outcome: Progressing  8/4/2019 1947 by Nenita Espinoza

## 2019-08-05 NOTE — PROGRESS NOTES
Hematology/Oncology follow up Note      S/p IVIG yesterday plts 29 last night but dropped back down to 2 this am.   No evidence of bleeding     Past Medical History:   Diagnosis Date   • Scoliosis     History reviewed. No pertinent surgical history.   Nirali Granado female with history of ITP with ITP     ITP- refractory   - previously responded to steroids in the past   - plts of 11--> s/p solumedrol and platelets with no response   - steroid refractory--> gave 1 dose of IVIG yesterday  - 8/3 plts went to 8 after ROSANGELA

## 2019-08-05 NOTE — DIETARY NOTE
ADULT NUTRITION INITIAL ASSESSMENT    Pt is at moderate nutrition risk. Pt meets malnutrition criteria.       CRITERIA FOR MALNUTRITION DIAGNOSIS:  Criteria for non-severe malnutrition diagnosis: chronic illness related to energy intake less than75% for gr INTERVENTION:  - RD Malnutrition Care Plan: Continue liberalized diet, Encouraged increased PO intake, Encouraged small frequent meals with emphasis on high calorie/high protein, Initiated ONS (oral nutritional supplements), Requested family/staff assistan kg (108 lb 9.6 oz)   08/04/19 0500 54.3 kg (119 lb 11.2 oz)   08/03/19 0424 47.9 kg (105 lb 8 oz)   08/02/19 1308 48.5 kg (107 lb)     Wt Readings from Last 10 Encounters:  08/05/19 : 49.3 kg (108 lb 9.6 oz)  08/02/19 : 48.5 kg (107 lb)  05/21/19 : 50.3 kg reviewed- intact  - Mendoza score 17 reflects high risk for skin breakdown    NUTRITION PRESCRIPTION:  Diet: Regular/General  Oral Supplements: chocolate Ensure with fortified cookies as HS snack to provide 450 kcal and 15 g protein daily  ESTIMATED NUTRITI

## 2019-08-05 NOTE — PROGRESS NOTES
[facility] Hospitalist Progress Note     CC: Hospital Follow up    PCP: Dena Fernandez MD       Assessment/Plan:     Principal Problem:    Acute ITP ([facility] [address]  Active Problems:     Thrombocytopenia [facility])    Ms. Mirna Fernandez is a 81 yo F with PMH of ITP who presented from P Intake 340 ml   Output 225 ml   Net 115 ml       Last 3 Weights  08/05/19 0637 : 108 lb 9.6 oz (49.3 kg)  08/04/19 0500 : 119 lb 11.2 oz (54.3 kg)  08/03/19 0424 : 105 lb 8 oz (47.9 kg)  08/02/19 1308 : 107 lb (48.5 kg)  08/02/19 1058 : 107 lb (48.5 kg) at 15:46              Meds:     • sodium chloride   Intravenous Once   • dexamethasone  40 mg Oral Daily       Normal Saline Flush, Normal Saline Flush, Normal Saline Flush, Normal Saline Flush, Metoclopramide HCl, Normal Saline Flush, acetaminophen, ondan

## 2019-08-05 NOTE — PLAN OF CARE
Post-transfusion platelets 29, Hgb 7.9 - Dr. Smliey Vera notified. Patient appears fatigued and anxious but states she feels more hopeful knowing of improved platelet result. Small amount of blood noted in stool, no other active bleeding noted.   C/o slight pt to call for assistance with activity based on assessment  - Modify environment to reduce risk of injury  - Provide assistive devices as appropriate  - Consider OT/PT consult to assist with strengthening/mobility  - Encourage toileting schedule  Outcome:

## 2019-08-06 NOTE — PROGRESS NOTES
[facility] Hospitalist Progress Note     CC: Hospital Follow up    PCP: Armando Hernandez MD       Assessment/Plan:     Principal Problem:    Acute ITP ([facility] [address]  Active Problems:     Thrombocytopenia [facility])    Ms. Quentin Herman is a 81 yo F with PMH of ITP who presented from P (36.4 °C)-98.8 °F (37.1 °C)] 97.7 °F (36.5 °C)  Pulse:  [63-92] 75  Resp:  [16-18] 16  BP: (123-151)/(61-85) 151/78      Intake/Output:    Intake/Output Summary (Last 24 hours) at 8/6/2019 1053  Last data filed at 8/6/2019 0850  Gross per 24 hour   Intake Intravenous Once   • Rho D Immune Globulin  300 mcg Intravenous Once   • Rho D Immune Globulin  300 mcg Intravenous Once   • Rho D Immune Globulin  300 mcg Intravenous Once   • Rho D Immune Globulin  300 mcg Intravenous Once   • Rho D Immune Globulin  300

## 2019-08-06 NOTE — PROGRESS NOTES
Hematology/Oncology follow up Note      S/p rhogham yesterday with plts of 15 this am. hgb holding  No evidence of bleeding, no evidence of hemolytic anemia     Past Medical History:   Diagnosis Date   • Scoliosis     History reviewed.  No pertinent surgica Imaging:   Reviewed     Assessment/Plan:  Santa Jeter is a 80 y.o female with history of ITP with ITP     ITP- refractory   - previously responded to steroids in the past   - plts of 11--> s/p solumedrol and platelets with no response   - steroid

## 2019-08-06 NOTE — PLAN OF CARE
Problem: Patient Centered Care  Goal: Patient preferences are identified and integrated in the patient's plan of care  Description  Interventions:  - What would you like us to know as we care for you?   - Provide timely, complete, and accurate informatio document skin integrity  - Monitor for areas of redness and/or skin breakdown  - Initiate interventions, skin care algorithm/standards of care as needed  Outcome: Progressing     Problem: HEMATOLOGIC - ADULT  Goal: Maintains hematologic stability  Descript

## 2019-08-06 NOTE — PLAN OF CARE
Plan of care reviewed with patient and family. IVP Rhogam given this am, platelets followed, and 1 unit of PRBCs given after. Patient anxious at times, but hopeful. Small amount of blood noted in stool, no other active bleeding noted. Tolerating diet.  Up Provide assistive devices as appropriate  - Consider OT/PT consult to assist with strengthening/mobility  - Encourage toileting schedule  Outcome: Progressing     Problem: SKIN/TISSUE INTEGRITY - ADULT  Goal: Skin integrity remains intact  Description  INT

## 2019-08-06 NOTE — WOUND PROGRESS NOTE
WOUND CARE NOTE      PLAN   Recommendations:  Dietary consult for recommendations for nutrition to optimize wound healing  IF up in the chair, sit on a pillow, reposition in the chair every 30 min  Lotion to dry skin bid and prn  Turn schedules  Heels el

## 2019-08-07 PROBLEM — E46 MALNUTRITION (HCC): Status: ACTIVE | Noted: 2019-01-01

## 2019-08-07 PROBLEM — D69.3 ACUTE ITP (HCC): Status: ACTIVE | Noted: 2019-01-01

## 2019-08-07 NOTE — PROGRESS NOTES
Hematology/Oncology follow up Note      S/p rhogham yesterday with plts of 24 this am   No bleeding     Past Medical History:   Diagnosis Date   • Scoliosis     History reviewed. No pertinent surgical history. Family History   Family history unknown:  Yes y.o female with history of ITP with ITP     ITP- refractory   - previously responded to steroids in the past   - plts of 11--> s/p solumedrol and platelets with no response   - steroid refractory--> gave 1 dose of IVIG yesterday  - 8/3 plts went to 8 after

## 2019-08-07 NOTE — PROGRESS NOTES
[facility] Hospitalist Progress Note     CC: Hospital Follow up    PCP: Clemencia Horner MD       Assessment/Plan:     Principal Problem:    Acute ITP ([facility] [address]  Active Problems:     Thrombocytopenia ([address]    Malnutrition ([facility] [address]    Ms. Marbella Tan is a 81 yo F with PMH of IT breastfeeding.     Temp:  [97.4 °F (36.3 °C)-98.6 °F (37 °C)] 97.5 °F (36.4 °C)  Pulse:  [71-73] 71  Resp:  [16-18] 18  BP: (121-155)/(54-70) 155/70      Intake/Output:  No intake or output data in the 24 hours ending 08/07/19 1003    Last 3 Weights  08/06/ QUEtiapine Fumarate  12.5 mg Oral Nightly   • Pantoprazole Sodium  40 mg Oral QAM AC   • sodium chloride   Intravenous Once   • dexamethasone  40 mg Oral Daily       Normal Saline Flush, Normal Saline Flush, Normal Saline Flush, Normal Saline Flush, Metocl

## 2019-08-08 NOTE — PLAN OF CARE
Vss, plt 11 today- 1 unit of plts transfused- tolerating diet, up to the bathroom with stand by and walker, PT/OT to treat once plt more stable, mepilex to sacrum, generalized bruising, IV's saline locked, plan to discharge to [facility] once medically stable.  Pe environment to reduce risk of injury  - Provide assistive devices as appropriate  - Consider OT/PT consult to assist with strengthening/mobility  - Encourage toileting schedule  Outcome: Progressing     Problem: SKIN/TISSUE INTEGRITY - ADULT  Goal: Skin in

## 2019-08-08 NOTE — PROGRESS NOTES
Hematology/Oncology follow up Note      plts 11 this am  No active bleeding     Past Medical History:   Diagnosis Date   • Scoliosis     History reviewed. No pertinent surgical history.   Family History   Family history unknown: Yes       SOCIAL HISTORY: So 0. 97 0.98   GFRAA 54* 58* 57*   GFRNAA 47* 50* 50*   CA 9.3 9.4 9.2    142 142   K 4.2 3.9 3.9    110 109   CO2 24.0 23.0 26.0       Imaging:   Reviewed     Assessment/Plan:  Sona Benz is a 80 y.o female with history of ITP with ITP     IT

## 2019-08-08 NOTE — PROGRESS NOTES
[facility] Hospitalist Progress Note     CC: Hospital Follow up    PCP: Dami Davis MD       Assessment/Plan:     Principal Problem:    Acute ITP ([facility] [address]  Active Problems:     Thrombocytopenia ([facility] [address]    Malnutrition ([facility] [address]    Ms. Juliet Chance is a 79 yo F with PMH of IT with reassurance. OBJECTIVE:    Blood pressure 153/80, pulse 75, temperature 97.6 °F (36.4 °C), temperature source Oral, resp. rate 20, height 160 cm (5' 3\"), weight 106 lb 11.2 oz (48.4 kg), SpO2 100 %, not currently breastfeeding.     Temp:  [97.3 °F  142 142   K 4.2 3.9 3.9    110 109   CO2 24.0 23.0 26.0       Recent Labs   Lab 08/06/19  0542 08/07/19  1340    232         Imaging:          Meds:     • sodium chloride   Intravenous Once   • QUEtiapine Fumarate  25 mg Oral Nightly

## 2019-08-08 NOTE — OCCUPATIONAL THERAPY NOTE
Pt with critically low plat lets, scheduled for transfusion. Spoke to nurse who is aware therapy will hold eval and reschedule for tomorrow.

## 2019-08-08 NOTE — CM/SW NOTE
KYLEE met with the pt. At bedside and spoke with the pt's dtrAlison Lay via telephone. The pt. Lives at home alone in a one level home with a basement where the laundry is. There is a chair lift to the basement. The pt.  Has a caregiver 4 days/week for 4 hour

## 2019-08-08 NOTE — PHYSICAL THERAPY NOTE
PT evaluation orders receivedPatient with critically low platelets, scheduled for transfusion. Spoke to nurse who is aware therapy will hold eval and reschedule for tomorrow.    Thank you,  Avery Buck, PT, DPT

## 2019-08-09 NOTE — PROGRESS NOTES
[facility] Hospitalist Progress Note     CC: Hospital Follow up    PCP: Kacie Allan MD       Assessment/Plan:     Principal Problem:    Acute ITP ([facility] [address]  Active Problems:     Thrombocytopenia ([address]    Malnutrition ([facility] [address]    Ms. Abby Hernandez is a 79 yo F with PMH of IT extensively with son and daughter at bedside    > 50 min involved in patient care     Further recommendations pending patient's clinical course.   [facility] hospitalist to continue to follow patient while in house     Tawny Khan MD    [facility] Hospitalist  Answering Se 33.1 33.5 32.9   RDW 12.3   < > 16.1* 16.0* 16.3* 16.0*   NEPRELIM 9.08*  --  14.53*  --  16.42*  --    WBC 9.9   < > 16.1* 14.8* 18.1* 15.8*   PLT 29.0*   < > 20.0* 11.0* 65.0* 17.0*    < > = values in this interval not displayed.          Recent Labs   La

## 2019-08-09 NOTE — OCCUPATIONAL THERAPY NOTE
Chart reviewed for OT evaluation. Patient with very low platelets today at 54,708. Spoke with RN, patient is to have platelets transfused today, will follow up this afternoon as she is medically appropriate after she receives platelets. RN aware.

## 2019-08-09 NOTE — OCCUPATIONAL THERAPY NOTE
Spoke with RN who states patient is starting platelet transfusion now. Will follow up for OT evaluation.

## 2019-08-09 NOTE — PLAN OF CARE
Patient alert but forgetful at times. On room air. Bleeding precautions. Pt voids. No BM this shift. Denies pain. Up with SBA with walker. Pt with bruising and petechiae on bilateral arms and legs. Mepilex to sacrum. Turns and repositions self.  Fall precau devices as appropriate  - Consider OT/PT consult to assist with strengthening/mobility  - Encourage toileting schedule  Outcome: Progressing     Problem: SKIN/TISSUE INTEGRITY - ADULT  Goal: Skin integrity remains intact  Description  INTERVENTIONS  - Asse

## 2019-08-09 NOTE — PLAN OF CARE
Pt anxious at times. Relaxation techniques provided with resolution of anxiety. Rho shiva given per orders. 1 unit of platelets given following the administration of rho shiva. Will recheck platelets in the morning.     Bone biopsy cancelled per pt/family r Provide assistive devices as appropriate  - Consider OT/PT consult to assist with strengthening/mobility  - Encourage toileting schedule  Outcome: Progressing     Problem: SKIN/TISSUE INTEGRITY - ADULT  Goal: Skin integrity remains intact  Description  INT

## 2019-08-09 NOTE — PROGRESS NOTES
Hematology/Oncology follow up Note      plts 17 today  hgb with mild drop   No active bleeding     Past Medical History:   Diagnosis Date   • Scoliosis     History reviewed. No pertinent surgical history. Family History   Family history unknown:  Yes previously responded to steroids in the past   - plts of 11--> s/p solumedrol and platelets with no response   - steroid refractory--> gave 1 dose of IVIG yesterday  - 8/3 plts went to 8 after IVIG  - 8/4- second dose of IVIG --> plts 29  - 8/5- plts 2 -

## 2019-08-10 NOTE — PROGRESS NOTES
[facility] Hospitalist Progress Note     CC: Hospital Follow up    PCP: Dianelys Hinson MD       Assessment/Plan:     Principal Problem:    Acute ITP ([facility] [address]  Active Problems:     Thrombocytopenia ([facility] [address]    Malnutrition ([facility] [address]    Ms. Xenia Hamilton is a 81 yo F with PMH of IT clinical course     Outpatient records or previous hospital records reviewed. Discussed extensively with son and daughter on phone 8/10    > 35 min involved in patient care     Further recommendations pending patient's clinical course.   [facility] hospitalist to > 33.5 32.9 32.9   RDW 16.1*   < > 16.3* 16.0* 15.9*   NEPRELIM 14.53*  --  16.42*  --  15.50*   WBC 16.1*   < > 18.1* 15.8* 18.2*   PLT 20.0*   < > 65.0* 17.0* 27.0*    < > = values in this interval not displayed.          Recent Labs   Lab 08/08/19  0515

## 2019-08-10 NOTE — HOSPICE RN NOTE
Met with son Robert Bailey and spoke with dtr Kumar Arnold [phone] on the phone. Discussed hospice services with family. Explained the different levels of hospice, including inpt hospice and the qualifications needed for inpt hospice.  Explained that mother does not qualify for

## 2019-08-10 NOTE — PHYSICAL THERAPY NOTE
PHYSICAL THERAPY EVALUATION - INPATIENT     Room Number: 468/468-A  Evaluation Date: 8/10/2019  Type of Evaluation: Initial   Physician Order: PT Eval and Treat    Presenting Problem: GI bleed, ITP  Reason for Therapy: Mobility Dysfunction and Discharge P reviewed. No pertinent surgical history.     HOME SITUATION  Type of Home: House   Home Layout: One level(basement with laundry and chair lift)  Stairs to Enter : 0             Lives With: Alone;Caregiver part-time  Drives: No  Patient Owned Equipment: [employer] Standardized Score (AM-PAC Scale): 33.86   CMS Modifier (G-Code): CL    FUNCTIONAL ABILITY STATUS  Gait Assessment   Gait Assistance: Not tested           Stoop/Curb Assistance: Not tested       Bed Mobility: mod assist x1    Transfers: mod x2 SPT     Ex

## 2019-08-10 NOTE — PROGRESS NOTES
[facility]  Progress Note    Shaina Oneil Patient Status:  Inpatient    10/19/1924 MRN [id number]   Location [facility] 4W/SW/SE Attending Trevro Mariscal MD   Hosp Day # 8 PCP Silver Valerio MD     Subjective:  Shaina Oneil is a(n) 80 year

## 2019-08-10 NOTE — OCCUPATIONAL THERAPY NOTE
OCCUPATIONAL THERAPY EVALUATION - INPATIENT     Room Number: 468/468-A  Evaluation Date: 8/10/2019  Type of Evaluation: Initial  Presenting Problem: Acute ITP; critically low platelets.      Physician Order: IP Consult to Occupational Therapy  Reason for Th Discharge Recommendations: Sub-acute rehabilitation  OT Device Recommendations: TBD    PLAN  OT Treatment Plan: Balance activities; ADL training;Functional transfer training; Endurance training;Patient/Family education;Equipment eval/education; Compensatory t Form  How much help from another person does the patient currently need…  -   Putting on and taking off regular lower body clothing?: A Lot  -   Bathing (including washing, rinsing, drying)?: A Lot  -   Toileting, which includes using toilet, bedpan or uri

## 2019-08-10 NOTE — CM/SW NOTE
8/10: SW received MDO for hospice. SW verified referral in via [facility]. Spoke to [facility], they have all necessary information. SW/CM to remain available for support and/or discharge planning.      [address], [place] [id number]

## 2019-08-11 NOTE — HOSPICE RN NOTE
Spoke with hospice supervisor regarding inpt status for pt. Because of platelet levels being unstable we could potentially admit GIP and transition to a facility. Informed Enrique ACOSTA of this.  Will meet with family this evening between 4-5pm to further discu

## 2019-08-11 NOTE — PLAN OF CARE
No acute distress observed. Patient became slightly agitated when evening medications given and assessment conducted. Stated that she just wants to be left alone so she can get some sleep. Son called in evening and updated on patient's condition.  PRN tyle environment to reduce risk of injury  - Provide assistive devices as appropriate  - Consider OT/PT consult to assist with strengthening/mobility  - Encourage toileting schedule  Outcome: Progressing     Problem: SKIN/TISSUE INTEGRITY - ADULT  Goal: Skin in

## 2019-08-11 NOTE — PLAN OF CARE
Problem: Patient Centered Care  Goal: Patient preferences are identified and integrated in the patient's plan of care  Description  Interventions:  - What would you like us to know as we care for you?   - Provide timely, complete, and accurate informatio usage: patient with low platelets)  INTERVENTIONS:  - Avoid intramuscular injections, enemas and rectal medication administration  - Ensure safe mobilization of patient  - Hold pressure on venipuncture sites to achieve adequate hemostasis  - Assess for sig

## 2019-08-11 NOTE — PROGRESS NOTES
[facility]  Progress Note    Tamika Mcdermott Patient Status:  Inpatient    10/19/1924 MRN [id number]   Location [facility] 4W/SW/SE Attending Cedric Bradley MD   Hosp Day # 9 PCP Tiffany Escalante MD     Subjective:  Tamika Mcdermott is a(n) 80 year 8/12/19. She will be monitored without lab testing on hospice, d/w pt's family. They expressed understanding. Discussed with Dr Aaliyah Arroyo. Benjamin Chambers

## 2019-08-11 NOTE — PROGRESS NOTES
[facility] Hospitalist Progress Note     CC: Hospital Follow up    PCP: Tia Schilling MD       Assessment/Plan:     Principal Problem:    Acute ITP ([facility] [address]  Active Problems:     Thrombocytopenia ([address]    Malnutrition ([facility] [address]    Ms. Zoraida Lopez is a 79 yo F with PMH of IT daughter and DIL on 8/11       Further recommendations pending patient's clinical course.   [facility] hospitalist to continue to follow patient while in house     Jose Allan MD    [facility] Hospitalist  Answering Service number: [phone]       Subjective:     Tired, 18.2* 18.0*  --    PLT 65.0* 17.0* 27.0* 12.0* 101.0*         Recent Labs   Lab 08/09/19  0537 08/10/19  0523 08/11/19  0542 08/11/19  0735   * 134* 140*  --    BUN 47* 47* 54*  --    CREATSERUM 1.06* 1.02 1.11*  --    GFRAA 52* 54* 49*  --    GFRNA

## 2019-08-11 NOTE — HOSPICE RN NOTE
Met with son Carley Goddard and daughter Billy Siegel to discuss hospice decision. Informed them that platelet is 068 after transfusion. Unsure if this is a false read or if medication is starting to work.  Informed children that this writer obtained permission from [employer]

## 2019-08-12 NOTE — PROGRESS NOTES
[facility] Pharmacy Note:  Renal Dose Adjustment for Metoclopramide (REGLAN)    Tangela Henley has been prescribed Metoclopramide (REGLAN) 10 mg every 6 hours as needed for nausea.     Estimated Creatinine Clearance: 25.5 mL/min (A) (based on SCr of 1.11 mg/dL (H)

## 2019-08-12 NOTE — PROGRESS NOTES
[facility]  Progress Note    Luiz Morocho Patient Status:  Inpatient    10/19/1924 MRN [id number]   Location [facility] 4W/SW/SE Attending Jaswinder Wray MD   Hosp Day # 10 PCP Tia Schilling MD     Subjective:  No acute events over the weeke

## 2019-08-12 NOTE — HOSPICE RN NOTE
Spoke with son Marbella Anaya and discussed plans for his mom. Son spoke with Dr Fady Ballard and we will be following up with patient over the next few days. POC was discussed with [facility] RN

## 2019-08-12 NOTE — PLAN OF CARE
Up with pivot to rolling chair. Specialty mattress in place. Had bowel movement overnight. Anxious and flustered at times. PRN ultram given. Patient snacking in evening.       Problem: Patient Centered Care  Goal: Patient preferences are identified and inte SKIN/TISSUE INTEGRITY - ADULT  Goal: Skin integrity remains intact  Description  INTERVENTIONS  - Assess and document risk factors for pressure ulcer development  - Assess and document skin integrity  - Monitor for areas of redness and/or skin breakdown  -

## 2019-08-12 NOTE — PROGRESS NOTES
[facility] Hospitalist Progress Note     CC: Hospital Follow up    PCP: Susy Diamond MD       Assessment/Plan:     Principal Problem:    Acute ITP ([address]  Active Problems:     Thrombocytopenia ([address]    Malnutrition ([address]    Ms. Israel Schrader is a 79 yo F with PMH of IT course     Outpatient records or previous hospital records reviewed. Discussed extensively with son today       Further recommendations pending patient's clinical course.   [facility] hospitalist to continue to follow patient while in house     Melanie Tariq MD    [facility] 101.0* 100.0*         Recent Labs   Lab 08/09/19  0537 08/10/19  0523 08/11/19  0542 08/11/19  0735   * 134* 140*  --    BUN 47* 47* 54*  --    CREATSERUM 1.06* 1.02 1.11*  --    GFRAA 52* 54* 49*  --    GFRNAA 45* 47* 43*  --    CA 8.9 8.9 9.0  --

## 2019-08-12 NOTE — CM/SW NOTE
MD order received regarding discharge planning for rehab. Referrals have been sent to [facility] as a back up. SW spoke with the pt's son Bernadine Cabrera who is aware of the above.      [facility] [phone]

## 2019-08-13 NOTE — PROGRESS NOTES
[facility]  Progress Note    Travis Khan Patient Status:  Inpatient    10/19/1924 MRN [id number]   Location [facility] 4W/SW/SE Attending Laron Sow MD   Hosp Day # 11 PCP Kacie Allan MD     Subjective:  Platelets with further improve

## 2019-08-13 NOTE — DIETARY NOTE
ADULT NUTRITION REASSESSMENT     Pt is at moderate nutrition risk. Pt meets malnutrition criteria.       CRITERIA FOR MALNUTRITION DIAGNOSIS:  Criteria for non-severe malnutrition diagnosis: chronic illness related to energy intake less than75% for greater opening the containers. 8/13/19 UPDATE caretaker with pt. Pt and caretaker say she is eating good. Snacking on chocolate during visit. Denied any GI issues.       NUTRITION INTERVENTION:  - RD Malnutrition Care Plan: Continue liberalized diet, Encour per DTR.     Patient Weight(s) for the past 336 hrs:   Weight   08/11/19 0536 52.2 kg (115 lb)   08/10/19 0631 50.6 kg (111 lb 9 oz)   08/09/19 0615 51.5 kg (113 lb 8 oz)   08/06/19 0513 48.4 kg (106 lb 11.2 oz)   08/05/19 0637 49.3 kg (108 lb 9.6 oz)   08/ intake.   - Anthropometric Measurement:      Monitor: wt and wt change  - Nutrition Goals:      halt wt loss, regain wt as able, PO greater than 75% of meals, good supplement intake and prevent skin breakdown    DIETITIAN FOLLOW UP: RD to follow up within 7

## 2019-08-13 NOTE — PLAN OF CARE
Platelets holding steady. Patient somewhat confused at times, worse in the afternoon/evening. Plan is to go to [place] [facility] tomorrow for rehab.

## 2019-08-13 NOTE — PROGRESS NOTES
[facility] Hospitalist Progress Note     CC: Hospital Follow up    PCP: Juan Zarco MD       Assessment/Plan:     Principal Problem:    Acute ITP ([facility] [address]  Active Problems:     Thrombocytopenia ([facility] [address]    Malnutrition ([facility] [address]    Ms. Rei Huff is a 81 yo F with PM of IT extensively with daughter today       Further recommendations pending patient's clinical course.   [facility] hospitalist to continue to follow patient while in house     [place] Riedel, MD    [facility] Hospitalist  Answering Service number: [phone]       Subjective: 08/10/19  0523 08/11/19  0542 08/11/19  0735 08/13/19  0710   * 140*  --  96   BUN 47* 54*  --  45*   CREATSERUM 1.02 1.11*  --  1.05*   GFRAA 54* 49*  --  53*   GFRNAA 47* 43*  --  46*   CA 8.9 9.0  --  8.6    141  --  141   K 4.3  --  4.8 4.

## 2019-08-13 NOTE — PLAN OF CARE
Patient platelets have stabilized. Remains somewhat confused. Plan is to discharge to [facility], who is requesting a night time caregiver due to confusion.

## 2019-08-13 NOTE — PHYSICAL THERAPY NOTE
PHYSICAL THERAPY TREATMENT NOTE - INPATIENT     Room Number: 468/468-A       Presenting Problem: GI bleed, ITP    Problem List  Principal Problem:    Acute ITP ([facility] [address]  Active Problems:     Thrombocytopenia (HCC)    Malnutrition (HCC)      PHYSICAL THERAPY AS Static Sitting: Fair +  Dynamic Sitting: Fair           Static Standing: Fair -  Dynamic Standing: Poor +      AM-PAC '6-Clicks' INPATIENT SHORT FORM - BASIC MOBILITY  How much difficulty does the patient currently have. ..  -   Zaire Childress Patient to demonstrate independence with home activity/exercise instructions provided to patient in preparation for discharge.    Goal #5   Current Status  ongoing

## 2019-08-13 NOTE — OCCUPATIONAL THERAPY NOTE
OCCUPATIONAL THERAPY TREATMENT NOTE - INPATIENT        Room Number: 468/468-A           Presenting Problem: Acute ITP; critically low platelets. Problem List  Principal Problem:    Acute ITP (HCC)  Active Problems:     Thrombocytopenia ([address]    Malnutri person does the patient currently need…  -   Putting on and taking off regular lower body clothing?: A Little  -   Bathing (including washing, rinsing, drying)?: A Little  -   Toileting, which includes using toilet, bedpan or urinal? : A Little  -   Puttin

## 2019-08-13 NOTE — PROGRESS NOTES
08/13/19 1821   Clinical Encounter Type   Visited With Patient and family together  (daughter Trista Corey present)   Routine Visit   (Consult)   Continue Visiting Yes   Patient Spiritual Encounters   Spiritual Assessment Completed No  (Pt was eating, daughte

## 2019-08-14 NOTE — WOUND PROGRESS NOTE
Wound Care Services  Follow up on the pt., she is resting in bed, she is on a Bartolo mattress, the pt's nurse and pct are at the bedside.  The pt.states she is just not comfortable, states it's not necessarily pain, but just getting comfortable in the bed with

## 2019-08-14 NOTE — PROGRESS NOTES
[facility]  Progress Note    Patsijovanni Yunior Patient Status:  Inpatient    10/19/1924 MRN [id number]   Location [facility] 4W/SW/SE Attending Gilberto Streeter MD   Hosp Day # 12 PCP Berny Reddy MD     Subjective:  Platelets with further improve

## 2019-08-14 NOTE — CM/SW NOTE
The pt. Has been accepted at [facility] and family is agreeable to hiring a caregiver to be with the pt. At night. The pt.  Is scheduled to discharge to [facility] today 8/14 at 530p, via ambulance, as she gets confused in the e

## 2019-08-14 NOTE — PLAN OF CARE
Problem: Patient Centered Care  Goal: Patient preferences are identified and integrated in the patient's plan of care  Description  Interventions:  - What would you like us to know as we care for you?   - Provide timely, complete, and accurate informatio document skin integrity  - Monitor for areas of redness and/or skin breakdown  - Initiate interventions, skin care algorithm/standards of care as needed  Outcome: Progressing     Problem: HEMATOLOGIC - ADULT  Goal: Maintains hematologic stability  Descript airway patency with patient fatigue and changes in neurological status  - Encourage and assist patient to increase activity and self care with guidance from PT/OT  - Encourage visually impaired, hearing impaired and aphasic patients to use assistive/commun

## 2019-08-14 NOTE — DISCHARGE SUMMARY
General Medicine Discharge Summary     Patient ID:  Rodney Tucker  80year old  10/19/1924    Admit date: 8/2/2019    Discharge date and time: 8/14/19    Attending Physician: Judah Rodriguez MD     Consults: IP CONSULT TO ONCOLOGY  IP CONSULT TO HEMATOLOGY  IP thrombocytopenia and petechiae admitted for refractory ITP, Patient was given steroids, IVIG, several dosis of Rhophylac, and a dose of N-plate, as well as multiple Plt transfusions, but continued to drop, plan was for hospice, but on 8/11, plts improved a discussing with hospice, but given improvement then will plan to hold on hospice        Operative Procedures:      Imaging:         Disposition: SNF    Activity: activity as tolerated  Diet: regular diet  Wound Care: as directed  Code Status: DNR  O2: none Coordinating Care: Greater than 30 minutes    Patient had opportunity to ask questions and state understand and agree with therapeutic plan as outlined    Thank Kristian Fitzpatrick M.D.  [facility] Hospitalist  Pager

## 2019-08-14 NOTE — BH PROGRESS NOTE
[facility] SOAZEEM Note    Theodore Donahue Patient Status:  Inpatient    10/19/1924 MRN [id number]   Location [facility] 4W/SW/SE Attending Tiana Bailey MD   Hosp Day # 12 PCP Pablo Vasques MD       S(subjective) Pt states that she has not been fee transport was called after assessment, and pt will not receive the recommended medication before d/c. Hospice will be contacted when appropriate.        Perez Bee [facility]  8/14/2019  4:03 PM

## 2019-08-23 NOTE — HOSPICE RN NOTE
[facility] met with patient, son/John and Dtr. Patient is currently not meeting [facility] hospice criteria. Patient to be admitted for hospital observation.   [facility] TNL to follow up 8/24/19

## 2019-08-23 NOTE — CM/SW NOTE
[facility] RN reports patient family was told they would be able to stay overnight per \"Dr. Kaiser Cull they could stay if she had an event like this and her platelets were this low. \" Family very upset she is not meeting inpatient criteria for Hospice and s

## 2019-08-23 NOTE — HOSPICE RN NOTE
POC was discussed with family and with Dr Jacqueline Moore. Spoke with ED Case Manager Osei Scanlon RN. Patient to be admitted to the Hospital and evaluated on 8/24/19  By [facility] for possible GIP status.

## 2019-08-23 NOTE — H&P
[facility] Hospitalist H&P     CC: Patient presents with:  Abnormal Result (metabolic, cardiac)       PCP: Shaunna Zamarripa MD    Admission Date: 8/23/2019    ASSESSMENT / PLAN:   Ms. Fidelina Montilla is a 81 yo F with PMH of ITP recent admission from 8/2-8/14 for treatme living now presenting with nose bleed and PLT of 7 requesting inpatient hospice. In the ED residential hospice evaluated, nose bleed had stopped patient may not be a candidate for inpatient hospice if no further symptoms evolve.  Plan for observe and di Ht 154.9 cm (5' 1\")   Wt 109 lb (49.4 kg)   SpO2 99%   BMI 20.60 kg/m²     GEN: NAD, frail, anxious  HEENT: EOMI, PERRLA, crusted dry blood nares, frail  Neck: Supple, no JVD  Pulm: CTAB, no crackles or wheezes  CV: RRR, no murmurs, 2+ peripheral pulses

## 2019-08-23 NOTE — ED NOTES
Report given to [facility]. Pt transported to [facility] on room air. VSS at time of departure. All pt belongings included in transport.

## 2019-08-24 NOTE — PLAN OF CARE
Patient admitted with very low platelets again. Patient and family would like to have hospice care, but may not qualify for in-patient at this time. She appears somewhat weak, and admitted to disorientation. Plan is unsure at this time.

## 2019-08-24 NOTE — HOSPICE RN NOTE
Pt observed in the chair. Appears dypneic while speaking. Complaining of aching in her right leg. Was given Tylenol for this. Pt states this is not new. Pt states that she just wants to go to sleep and not wake up. No family at bedside.  Will follow up late

## 2019-08-24 NOTE — PROGRESS NOTES
Patient received from [address], alert and oriented, very weak. Son at bedside. Was able to ambulate to bathroom with walker and assist. mepilex applied to sacral, some redness noted. Endorsed to oncoming RN.

## 2019-08-24 NOTE — PROGRESS NOTES
[facility] Hospitalist Progress Note     Reason for Admission: epistaxis   PCP: Shelley Mensah MD     Assessment/Plan:     Principal Problem:     Thrombocytopenia [facility])    Ms. Vijay Avitia is a 81 yo F with PMH of ITP recent admission from 8/2-8/14 for treatment of sev 8/24/2019 0550  Gross per 24 hour   Intake 0 ml   Output —   Net 0 ml       Last 3 Weights  08/23/19 1221 : 109 lb (49.4 kg)  08/11/19 0536 : 115 lb (52.2 kg)  08/10/19 0631 : 111 lb 9 oz (50.6 kg)  08/09/19 0615 : 113 lb 8 oz (51.5 kg)  08/06/19 0513 : 10

## 2019-08-24 NOTE — ED PROVIDER NOTES
Patient Seen in: [facility] ER    History   Patient presents with:  Abnormal Result (metabolic, cardiac)    Stated Complaint: low Plt    HPI    80year old female with a past medical history of depression, GERD, hypertension, high cholesterol, kno Temp 98.2 °F (36.8 °C) (Oral)   Resp 20   Ht 154.9 cm (5' 1\")   Wt 49.4 kg   SpO2 99%   BMI 20.60 kg/m²         Physical Exam   Constitutional: She is oriented to person, place, and time. She appears well-developed and well-nourished. She is cooperative. RDW-SD 66.3 (*)     RDW 17.9 (*)     PLT 7.0 (*)     All other components within normal limits   ED/MRSA SCREEN BY PCR-CC - Normal   CBC WITH DIFFERENTIAL WITH PLATELET    Narrative:      The following orders were created for panel order CBC WITH DIFFERENTI for panel order TYPE AND SCREEN.   Procedure                               Abnormality         Status                     ---------                               -----------         ------                     ABORH (BLOOD TYPE)[[id number]] would like pt to stay inpatient in hospital, advised pt is still declining any treatment. Admission disposition: 8/23/2019  3:55 PM                 Disposition and Plan     Clinical Impression:   Thrombocytopenia ([facility] [address]  (primary encounter diagnosis)

## 2019-08-24 NOTE — PROGRESS NOTES
Patient alert. Family at bedside. Resting in bed. Up to chair throughout the day. Ambulating to bathroom. Tylenol and lidoderm patch for c/o leg pain. No distress noted.

## 2019-08-25 NOTE — PLAN OF CARE
Patient without epistaxis through night. Generalized bruising remains. Bleeding precautions maintained. Patient denies need for further pain medications through night.   Problem: HEMATOLOGIC - ADULT  Goal: Free from bleeding injury  Description  (Example evaluate response  - Implement non-pharmacological measures as appropriate and evaluate response  - Consider cultural and social influences on pain and pain management  - Manage/alleviate anxiety  - Utilize distraction and/or relaxation techniques  - Monit

## 2019-08-25 NOTE — PROGRESS NOTES
[facility] Hospitalist Progress Note     Reason for Admission: epistaxis   PCP: Magdalena Villa MD     Assessment/Plan:     Principal Problem:     Thrombocytopenia [facility])    Ms. Marbella Tan is a 79 yo F with PMH of ITP recent admission from 8/2-8/14 for treatment of sev 1221 : 109 lb (49.4 kg)  08/11/19 0536 : 115 lb (52.2 kg)  08/10/19 0631 : 111 lb 9 oz (50.6 kg)  08/09/19 0615 : 113 lb 8 oz (51.5 kg)  08/06/19 0513 : 106 lb 11.2 oz (48.4 kg)  08/05/19 0637 : 108 lb 9.6 oz (49.3 kg)  08/04/19 0500 : 119 lb 11.2 oz (54.3

## 2019-08-25 NOTE — HOSPICE RN NOTE
Per Angela Gonsalez RN pt is becoming weaker, but has no s/s of active bleeding. VS still stable. No lab work today. Believe pt is declining but not GIP appropriate yet. Explained this to the granddaughter at bedside. Pt sleeping. Will reevaluate tomorrow.  POC upd

## 2019-08-26 NOTE — DISCHARGE SUMMARY
[facility] Hospitalist Discharge Summary   Patient ID:  Gabbi Diallo [id number]  89 year old 10/19/1924    Admit date: 8/23/2019  Discharge date: 8/26/2019  Risk of Readmission Lace+ Score: 72  59-90 High Risk  29-58 Medium Risk  0-28   Low Risk    Primary Care

## 2019-08-26 NOTE — PLAN OF CARE
Problem: Patient/Family Goals  Goal: Patient/Family Short Term Goal  Description  Patient's Short Term Goal: Hospice admission    Interventions:   - See additional Care Plan goals for specific interventions   Outcome: Progressing     Problem: Patient Drew Anticipate increased pain with activity and pre-medicate as appropriate  Outcome: Progressing     Problem: SKIN/TISSUE INTEGRITY - ADULT  Goal: Skin integrity remains intact  Description  INTERVENTIONS  - Assess and document risk factors for pressure ulcer Progressing  Patient alert and oriented, vitals stable. Patient with complaints of leg pain and some intermittent generalized pain, lidoderm patch to right thigh and tramadol given. Patient appetite decreasing.  Noticeably weaker than yesterday, upon trying

## 2019-08-26 NOTE — PROGRESS NOTES
[facility] Hospitalist Progress Note     Reason for Admission: epistaxis   PCP: Magdalena Villa MD     Assessment/Plan:     Principal Problem:     Thrombocytopenia [facility])    Ms. Marbella Tan is a 79 yo F with PMH of ITP recent admission from 8/2-8/14 for treatment of sev 119/64      Intake/Output:    Intake/Output Summary (Last 24 hours) at 8/26/2019 1018  Last data filed at 8/25/2019 1700  Gross per 24 hour   Intake 300 ml   Output —   Net 300 ml       Last 3 Weights  08/23/19 1221 : 109 lb (49.4 kg)  08/11/19 0536 : 115

## 2019-08-26 NOTE — HOSPICE RN NOTE
Patient admit to GIP for anxiety. DX thrombocytopenia. Consents signed with [facility]/John Brink and Dr Bryson Echavarria contacted as to [facility] admission, both agreeable.   POC reviewed with DAVE Saldaña

## 2019-08-26 NOTE — PLAN OF CARE
Patient remains on bleeding precautions, no labs to measure thrombocytopenia. Morphine x1 and ultram x1 for pain through night.   Problem: Patient Centered Care  Goal: Patient preferences are identified and integrated in the patient's plan of care  [facility] SKIN/TISSUE INTEGRITY - ADULT  Goal: Skin integrity remains intact  Description  INTERVENTIONS  - Assess and document risk factors for pressure ulcer development  - Assess and document skin integrity  - Monitor for areas of redness and/or skin breakdown  -

## 2019-08-26 NOTE — PLAN OF CARE
Pt admitted to hospice, family at bedside, [facility] 68 mattress in place for comfort.

## 2019-08-26 NOTE — WOUND PROGRESS NOTE
Specialty bed  Spoke with the pt's nurse, the pt. is uncomfortable on the Comforgel mattress, ordered the pt. a Bartolo mattress for comfort. The pt's nurse is aware.

## 2019-08-26 NOTE — H&P
[facility] Hospitalist H&P     CC: No chief complaint on file.        PCP: Monica Frederick MD    Admission Date: 8/26/2019    ASSESSMENT / PLAN:   Ms. Ever Fleming is a 81 yo F with PMH of ITP recent admission from 8/2-8/14 for treatment of severe thrombocytopenia pat hospice. Epistaxis resolved. Due to significant anxiety patient was admitted to inpatient hospice for symptom control.        PMH  Past Medical History:   Diagnosis Date   • Anemia    • Depression    • Disorder of thyroid    • Esophageal reflux    • High bl

## 2019-08-27 NOTE — PROGRESS NOTES
[facility] Hospitalist Progress Note     Reason for Admission: epistaxis   PCP: Ady Kwok MD     Assessment/Plan:     Active Problems:     Thrombocytopenia [facility])    Ms. Ora Cowden is a 81 yo F with PMH of ITP recent admission from 8/2-8/14 for treatment of sever kg)  08/02/19 1308 : 107 lb (48.5 kg)  08/02/19 1058 : 107 lb (48.5 kg)      Exam   GEN: NAD, frail, anxious, A&Ox1 (person)  HEENT: NC AT  Neck: Supple, no JVD  Pulm: CTAB, no crackles or wheezes  CV: RRR, no murmurs, 2+ peripheral pulses  ABD: Soft, non-

## 2019-08-27 NOTE — CM/SW NOTE
AAKASH matthews 8/27/19. The pt appeared calm, comfortable, was sleeping at time of visit. No family present. As per FN , family had just spoken with FN. MSW provided supportive presence.   AAKASH Snow  [facility]  [phone]

## 2019-08-27 NOTE — PLAN OF CARE
Problem: Patient Centered Care  Goal: Patient preferences are identified and integrated in the patient's plan of care  Description  Interventions:  - Provide timely, complete, and accurate information to patient/family  - Incorporate patient and family k SAFETY ADULT - FALL  Goal: Free from fall injury  Description  INTERVENTIONS:  - Assess pt frequently for physical needs  - Identify cognitive and physical deficits and behaviors that affect risk of falls.   - Roberta fall precautions as indicated by asse

## 2019-08-27 NOTE — HOSPICE RN NOTE
GIP DAY 2  PPS 30%  Dose of Morphine given for dyspnea  Resting in bed  Some dyspnea noted  POC was discussed with Santa Brian RN and with Dr Aaliyah Arroyo

## 2019-08-28 NOTE — PLAN OF CARE
Problem: Patient Centered Care  Goal: Patient preferences are identified and integrated in the patient's plan of care  Description  Interventions:  - What would you like us to know as we care for you?  I want to be comfortable  - Provide timely, complete, with activity and pre-medicate as appropriate  Outcome: Progressing     Problem: SAFETY ADULT - FALL  Goal: Free from fall injury  Description  INTERVENTIONS:  - Assess pt frequently for physical needs  - Identify cognitive and physical deficits and behavi

## 2019-08-28 NOTE — PROGRESS NOTES
[facility] Hospitalist Progress Note     Reason for Admission: epistaxis   PCP: Zhane Delgado MD     Assessment/Plan:     Active Problems:     Thrombocytopenia [facility])    Ms. Bijal Hadley is a 81 yo F with PMH of ITP recent admission from 8/2-8/14 for treatment of sever 0500 : 119 lb 11.2 oz (54.3 kg)  08/03/19 0424 : 105 lb 8 oz (47.9 kg)  08/02/19 1308 : 107 lb (48.5 kg)  08/02/19 1058 : 107 lb (48.5 kg)      Exam   GEN: NAD, frail, anxious, A&Ox1 (person)  HEENT: NC AT  Neck: Supple, no JVD  Pulm: CTAB, no crackles or

## 2019-08-28 NOTE — HOSPICE RN NOTE
GIP DAY 3  Patient alert and oriented   Given dose of IVP Morphine for her dyspnea/pain to right hip/leg  Up in chair  POC was discussed with Dr Ana Paula Jimenez  Patient remains inpatient at this time  POC was discussed with [facility] RN

## 2019-08-28 NOTE — HOSPICE RN NOTE
[facility] met with daughter again to discuss the POC. Daughter is in agreement with giving her mom the medications as ordered for dyspnea/pain. Morphine IVP was given earlier and the daughter understands that we will be giving IVP Morphine as nee

## 2019-08-28 NOTE — PROGRESS NOTES
08/28/19 1100   Clinical Encounter Type   Visited With Patient; Health care provider   Routine Visit Introduction   Continue Visiting Yes   Referral From Nurse   Referral To [address] Needs Prayer   Patient Spiritual Enc

## 2019-08-28 NOTE — PLAN OF CARE
Patient alert, but forgetful at times. On room air. Pt voids. No BM overnight. Denies pain, nausea, or vomiting. Patient with generalized bruising and a sacral wound. Sacral dressing CDI. Turned and repositioned as tolerated. Tolerating pleasure feeds.  Edward Ditch non-pharmacological measures as appropriate and evaluate response  - Consider cultural and social influences on pain and pain management  - Manage/alleviate anxiety  - Utilize distraction and/or relaxation techniques  - Monitor for opioid side effects  - N

## 2019-08-29 NOTE — HOSPICE RN NOTE
GIP DAY 4  Patient did get dose of IVP Morphine for her pain this AM  Pallor  Room air  No signs of any active bleeding noted  POC was discussed with Dr Noelle Desai and with Shana More RN  Caregiver at bedside with patient

## 2019-08-29 NOTE — PROGRESS NOTES
[facility] Hospitalist Progress Note     Reason for Admission: epistaxis   PCP: Sara Maddox MD     Assessment/Plan:     Active Problems:     Thrombocytopenia [facility])    Ms. Cassi Brennan is a 81 yo F with PMH of ITP recent admission from 8/2-8/14 for treatment of sever (54.3 kg)  08/03/19 0424 : 105 lb 8 oz (47.9 kg)  08/02/19 1308 : 107 lb (48.5 kg)  08/02/19 1058 : 107 lb (48.5 kg)      Exam   GEN: NAD, frail, anxious, A&O    HEENT: NC AT  Neck: Supple, no JVD  Pulm: CTAB, no crackles or wheezes  CV: RRR, no murmurs, 2

## 2019-08-29 NOTE — PLAN OF CARE
Patient alert, but forgetful at times. On room air. Patient voids. Received a bath this morning. Pain managed with PRN morphine. Turned and repositioned as tolerated. Pt with a sacral wound - dressing CDI. Fall precautions in place.  Call light within reach evaluate response  - Consider cultural and social influences on pain and pain management  - Manage/alleviate anxiety  - Utilize distraction and/or relaxation techniques  - Monitor for opioid side effects  - Notify MD/LIP if interventions unsuccessful or pa

## 2019-08-29 NOTE — HOSPICE RN NOTE
POC was discussed with daughter regarding the use of IVP medications. The daughter does understand that her mom does need the IVP Morphine for her pain and is in agreement with the POC.

## 2019-08-30 NOTE — PROGRESS NOTES
[facility] Hospitalist Progress Note     Reason for Admission: epistaxis   PCP: Maria Antonia Burgess MD     Assessment/Plan:     Active Problems:     Thrombocytopenia [facility])    Ms. Christiano Rubio is a 81 yo F with PMH of ITP recent admission from 8/2-8/14 for treatment of sever 105 lb 8 oz (47.9 kg)  08/02/19 1308 : 107 lb (48.5 kg)  08/02/19 1058 : 107 lb (48.5 kg)      Exam   GEN: NAD, frail, anxious, A&O    HEENT: NC AT  Neck: Supple, no JVD  Pulm: CTAB, no crackles or wheezes  CV: RRR, no murmurs, 2+ peripheral pulses  ABD: S

## 2019-08-30 NOTE — PLAN OF CARE
Bathed overnight. Repositioned as tolerated. Voiding. PRN morphine IVP for pain management. PRN ativan IVP available for anxiety/agitation.        Problem: Patient Centered Care  Goal: Patient preferences are identified and integrated in the patient's plan Implement non-pharmacological measures as appropriate and evaluate response  - Consider cultural and social influences on pain and pain management  - Manage/alleviate anxiety  - Utilize distraction and/or relaxation techniques  - Monitor for opioid side ef

## 2019-08-30 NOTE — PLAN OF CARE
Problem: DEATH & DYING  Goal: Pt/Family communicate acceptance of impending death and feel psychological comfort and peace  Description  INTERVENTIONS:  - Assess patient/family anxiety and grief process related to end of life issues  - Provide emotional consult to assist with strengthening/mobility  - Encourage toileting schedule  Outcome: Progressing     Patient alert and oriented. reports pain managed with icy hot patch on lower back, morphine & roxinol available. Tolerating regular diet.  Up stand & piv

## 2019-08-30 NOTE — HOSPICE RN NOTE
GIP day 5. Pt alert and oriented. Lying in bed for breakfast. According to the caregiver, pt was able to walk to the bathroom and lidocaine patch applied. Pt with no complaints of pain. Has increased pain when sitting in the chair. No active bleeding.  PRN

## 2019-08-31 NOTE — CM/SW NOTE
SW met with hospice rn and son and daughter to discuss discharge planning . Family very upset about feeling like they have been miscommunication with about being able to stay here. SW and rn attempted to support and educate about medicare guidelines and all

## 2019-08-31 NOTE — HOSPICE RN NOTE
GIP day 6  Patient presents with confusion alert to person and place. Patient states she feels \"anxious. \" PRN ativan IVP given  Discussed changes in cognition with CG Lizzy Werner who was present at bedside.   Also discussed keeping questions to patient to a

## 2019-09-01 NOTE — PROGRESS NOTES
[facility] Hospitalist Progress Note     Reason for Admission: epistaxis   PCP: Shelley Mensah MD     Assessment/Plan:     Active Problems:     Thrombocytopenia [facility])    Ms. Vijay Avitia is a 81 yo F with PMH of ITP recent admission from 8/2-8/14 for treatment of sever 9.6 oz (49.3 kg)  08/04/19 0500 : 119 lb 11.2 oz (54.3 kg)  08/03/19 0424 : 105 lb 8 oz (47.9 kg)  08/02/19 1308 : 107 lb (48.5 kg)  08/02/19 1058 : 107 lb (48.5 kg)      Exam   GEN: NAD, frail    HEENT: NC AT  Neck: Supple, no JVD  Pulm: CTAB, no crackles

## 2019-09-01 NOTE — HOSPICE RN NOTE
GIP day 7  Patient sitting up in bed having breakfast.  CG Elsy at bedside. Patient is able to feed self, no signs of dysphagia.   After [facility] RN and SW meeting with [facility] and dtr Juan Sprague 8/31/19  decision was made to approach patients labile nancy

## 2019-09-02 NOTE — HOSPICE RN NOTE
Attempted to plan for discharge and order DME for delivery as discussed with family in meeting on 8/31/19. Call placed to [facility] to review DME and set up delivery time. Guillermo Zamora refusing DME delivery until he can meet with Livia Armstrong on Tuesday.   [facility]

## 2019-09-02 NOTE — PLAN OF CARE
Bathed. Scheduled ativan IVP. Pivot to rolling chair in evening to use bathroom. Incontinent later in shift. Pericare provided.        Problem: Patient Centered Care  Goal: Patient preferences are identified and integrated in the patient's plan of care  Alexey Monitor for opioid side effects  - Notify MD/LIP if interventions unsuccessful or patient reports new pain  - Anticipate increased pain with activity and pre-medicate as appropriate  Outcome: Progressing     Problem: SAFETY ADULT - FALL  Goal: Free from fa

## 2019-09-02 NOTE — PROGRESS NOTES
[facility] Hospitalist Progress Note     Reason for Admission: epistaxis   PCP: Shelley Mensah MD     Assessment/Plan:     Active Problems:     Thrombocytopenia [facility])    Ms. Vijay Avitia is a 79 yo F with PMH of ITP recent admission from 8/2-8/14 for treatment of sever kg)  08/05/19 0637 : 108 lb 9.6 oz (49.3 kg)  08/04/19 0500 : 119 lb 11.2 oz (54.3 kg)  08/03/19 0424 : 105 lb 8 oz (47.9 kg)  08/02/19 1308 : 107 lb (48.5 kg)  08/02/19 1058 : 107 lb (48.5 kg)      Exam   GEN: NAD, frail   HEENT: NC AT  Neck: Supple, no J

## 2019-09-02 NOTE — HOSPICE RN NOTE
POC was discussed with daughter at bedside earlier. Called [facility] [place]. Not able to leave message. The mailbox is full. Spoke with [facility] Medical Director to give him update. Patient did get Tylenol for right hip pain. Ricarda Gilford the caregiver at [facility]

## 2019-09-02 NOTE — PROGRESS NOTES
[facility] Hospitalist Progress Note     Reason for Admission: epistaxis   PCP: Ady Kwok MD     Assessment/Plan:     Active Problems:     Thrombocytopenia [facility])    Ms. Ora Cowden is a 81 yo F with PMH of ITP recent admission from 8/2-8/14 for treatment of sever lb 9.6 oz (49.3 kg)  08/04/19 0500 : 119 lb 11.2 oz (54.3 kg)  08/03/19 0424 : 105 lb 8 oz (47.9 kg)  08/02/19 1308 : 107 lb (48.5 kg)  08/02/19 1058 : 107 lb (48.5 kg)      Exam   GEN: NAD, frail,    HEENT: NC AT  Neck: Supple, no JVD  Pulm: CTAB, no crac

## 2019-09-02 NOTE — PLAN OF CARE
Problem: DEATH & DYING  Goal: Pt/Family communicate acceptance of impending death and feel psychological comfort and peace  Description  INTERVENTIONS:  - Assess patient/family anxiety and grief process related to end of life issues  - Provide emotional consult to assist with strengthening/mobility  - Encourage toileting schedule  Outcome: Progressing     Patient more drowsy but easily arousable, oriented. Reports pain on right hip, given tylenol, lidocaine patch in place.  Occasional incontinence, purewic

## 2019-09-03 NOTE — PLAN OF CARE
Patient up to bathroom with rolling chair. Caregiver at bedside. Scheduled ativan given and prn morphine given once for c/o right leg pain. Plan for potential discharge with hospice care tomorrow.    Problem: Patient Centered Care  Goal: Patient preferences anxiety  - Utilize distraction and/or relaxation techniques  - Monitor for opioid side effects  - Notify MD/LIP if interventions unsuccessful or patient reports new pain  - Anticipate increased pain with activity and pre-medicate as appropriate  Outcome: P

## 2019-09-03 NOTE — HOSPICE RN NOTE
GIP DAY 8   Patient alert and oriented ate her breakfast this am with no help  Up to rolling commode with assistance to use the toilet for BM  Up in chair today  Had scheduled dose of Ativan for anxiety  No active signs of bleeding noted  Denied any pain

## 2019-09-03 NOTE — PROGRESS NOTES
[facility] Hospitalist Progress Note     Reason for Admission: epistaxis   PCP: Monica Frederick MD     Assessment/Plan:     Active Problems:     Thrombocytopenia [facility])    Ms. Ever Fleming is a 81 yo F with PMH of ITP recent admission from 8/2-8/14 for treatment of sever his mother out of the hospital' and that we promised the family that she could stay until she had passed.   I calmly explained that this was not promised, and that we would monitor her in the hospital for bleeding, control her symptoms and if she no longer temperature source Oral, resp. rate 20, SpO2 100 %, not currently breastfeeding.     Temp:  [98.1 °F (36.7 °C)-98.3 °F (36.8 °C)] 98.1 °F (36.7 °C)  Pulse:  [78-91] 91  Resp:  [16-20] 20  BP: (123-131)/(62-65) 123/62      Intake/Output:    Intake/Output Sum diphenhydrAMINE, Ipratropium Bromide, acetaminophen

## 2019-09-04 NOTE — HOSPICE RN NOTE
POC was discussed with Faustino Headley the son. He is agreeable to have the DME delivered on 9/5/19 in the morning before NOON. He is aware that it is imperative he accepts the DME and medications for delivery on 9/5/19 so his mom can go home.   His mother is now [facility]

## 2019-09-04 NOTE — PROGRESS NOTES
[facility] Hospitalist Progress Note     Reason for Admission: epistaxis   PCP: Dalila Art MD     Assessment/Plan:     Active Problems:     Thrombocytopenia [facility])    Ms. Rei Huff is a 79 yo F with PMH of ITP recent admission from 8/2-8/14 for treatment of sever Hospitalist     Subjective:     Seems comfortable today, no signs of bleeding, denies pain this am     OBJECTIVE:    Blood pressure 135/66, pulse 99, temperature 97.4 °F (36.3 °C), temperature source Oral, resp.  rate 18, SpO2 100 %, not currently breastfee sulfate, haloperidol lactate **OR** haloperidol lactate, LORazepam **OR** LORazepam **OR** LORazepam, Atropine Sulfate, glycopyrrolate, bisacodyl, ondansetron **OR** ondansetron HCl, diphenhydrAMINE, Ipratropium Bromide, acetaminophen

## 2019-09-04 NOTE — PLAN OF CARE
Plan of care reviewed with Oniel Santana and family at bedside. Patient appears comfortable and states she wants to go home. Plan for discharge tomorrow. Home equipment to be delivered and patient discharged with home hospice. Safety measures in place.    Problem: pain and pain management  - Manage/alleviate anxiety  - Utilize distraction and/or relaxation techniques  - Monitor for opioid side effects  - Notify MD/LIP if interventions unsuccessful or patient reports new pain  - Anticipate increased pain with activit

## 2019-09-04 NOTE — HOSPICE RN NOTE
GIP DAY 10  The plan was for this patient to go to [facility] at [facility] home this afternoon. This morning the son Guillermo Zamora called me at 18am to let me know that they are cancelling the delivery of DME and medications. They are not able to accept delivery today.

## 2019-09-05 NOTE — DISCHARGE SUMMARY
General Medicine Discharge Summary     Patient ID:  Poly Rhoades  80year old  10/19/1924    Admit date: 8/26/2019    Discharge date and time: 09/05/19    Attending Physician: Latoya Leblanc MD     Primary Care Physician: Landen Blair MD     [facility] CODE-DNR  - POA- son Jagruti Manning  - discussed with daughter at the bedside again on 9.4, planning for home hospice, she states they are unable to accept her today, as their cargiver was sick.  I offered SNF, she declined, hospice DME was supposed to be del

## 2019-09-05 NOTE — HOSPICE RN NOTE
[facility] Level of Care   Patient   alert and oriented  Looking forward to going home this afternoon  Ate her breakfast with help from [address]  [address] was discussed with Daughter Honorio Crisostomo and with Alfred and Tristian Bray aware of D/C  Super[place] i

## 2020-02-01 NOTE — PLAN OF CARE
Pt's platelets were 24 this morning and 20 in the afternoon, Dr. Gloria Stokes aware, pt says she feels very tired today, up to water with walker and one assist, bed alarm on, family at bedside     Problem: Patient Centered Care  Goal: Patient preferences are Problem: SKIN/TISSUE INTEGRITY - ADULT  Goal: Skin integrity remains intact  Description  INTERVENTIONS  - Assess and document risk factors for pressure ulcer development  - Assess and document skin integrity  - Monitor for areas of redness and/or skin b 38 37 40

## 2024-07-24 NOTE — PROGRESS NOTES
FYI - Status Update    Who is Calling: nurseCharlene from [facility]    Update: Patient's wound to her R leg is worsening. It has extensive, large amounts of  drainage. The drainage was noted to be blue-green in color per their NP and there are concerns it could be pseudomonas. It was sent for a culture today. There is a foul odor to it. She was on Augmentin from 7/16-7/21.     The skin is macerated, they are changing the dressing at least once per day. The patient did request an oil emulsion dressing vs the xeroform. She did finally allow staff to use the xeroform today.       They have been cleansing wound with vashe and covering with xerofrom  and covering with ABD and kerlix. She is not tolerating compression due to the severe pain she has. Patient is very incontinent and this further complicates wound cares.    She is not very compliant with doing twice daily dressings although patient will deny this, she has not developed blistered area to R upper shin. She also has almost her R entire calf that is entirely open. Their NP saw patient and did change orders from Santyl to Vashe.    /74, pulse 105. Afebrile.    Please review update.    JOHAN BansalN, RN    Does caller want a call/response back: Yes     [facility] Hospitalist Progress Note     Reason for Admission: epistaxis   PCP: Maricel Barrera MD     Assessment/Plan:     Active Problems:     Thrombocytopenia [facility])    Ms. Abby Hernandez is a 79 yo F with PMH of ITP recent admission from 8/2-8/14 for treatment of sever (47.9 kg)  08/02/19 1308 : 107 lb (48.5 kg)  08/02/19 1058 : 107 lb (48.5 kg)      Exam   GEN: NAD, frail, anxious, A&O    HEENT: NC AT  Neck: Supple, no JVD  Pulm: CTAB, no crackles or wheezes  CV: RRR, no murmurs, 2+ peripheral pulses  ABD: Soft, non-ten

## 2024-08-30 NOTE — SPIRITUAL CARE NOTE
CHP did visit this date to assess for Spiritual/Emotional Care needs. Present was pt and c/g. Pt is new [facility] admit. Pt engaged, appears a/o x3. Pt reports pain, report made to [facility] RN. Otherwise pt has no concerns this date.  Supportive call m
Stopped by but PT's family member was feeding her. Left note on door - a  will stop by another time.  GG
<--- Click to Launch ICDx for PreOp, PostOp and Procedure

## 2025-01-25 NOTE — HOSPICE RN NOTE
GIP DAY 8  Patient resting in bed at this time received a dose of IVP Lorazepam 0.25mg for anxiety  No signs or symptoms of bleeding noted  Patient denied any pain when asked  No dyspnea noted  Caregiver at bedside  Continent of bowel and bladder  Room Air Your CT scan shows you have worsening hydronephrosis with obstruction at your ureter/kidney junction this will require close follow-up with a urologist.  Follow-up with Dr. Lakhani's office first thing Monday and schedule first available appointment.  Return to the ER for any acute change or worsening of your condition.  We have provided you with pain medication and nausea medication to help you get through until you can see a urologist.

## (undated) NOTE — IP AVS SNAPSHOT
[facility]            (For Outpatient Use Only) Initial Admit Date: 8/2/2019   Inpt/Obs Admit Date: Inpt: 8/2/19 / Obs: N/A   Discharge Date:    Jewels Andino:  [de-identified]   MRN: [de-identified]   CSN: [id number]   CEID: [id number]        [facility] Subscriber ID:  Pt Rel to Subscriber:    Hospital Account Financial Class: Medicare    August 14, 2019

## (undated) NOTE — LETTER
August 15, 2019          [address] [place] [id number]          Dear Get Craig:    Your recent urine testing did not show evidence of infection. Please call with questions. No name on file.

## (undated) NOTE — IP AVS SNAPSHOT
Patient Demographics     Address  [address] [place] [address]  [place] [id number] Phone  [phone] [facility])  [phone] [facility]      Emergency Contact(s)     Name Relation Home Work Grzegorz Daughter [phone]    [facility] Take 1 tablet (100 mcg total) by mouth once daily.    Claudia Zuniga MD         TYLENOL OR  Next dose due:  WHEN NEEDED      Taking 4-6 tabs daily                Where to Get Your Medications      Please  your prescriptions at the location [facility] Ordering provider:  Alex Mcadams MD  08/14/19 2704 Resulting lab:  [facility]    Specimen Information    Type Source Collected On   Blood — 08/14/19 0537          Components    Component Value Reference Range Flag Lab   Neutrophil Absolute Manual 21.47 1.5 Leukocyte Esterase Urine Small Negative A [facility]   Ascorbic Acid Urine Negative Negative mg/dL [employer]   [employer]  Cells Few /HPF — [facility]   WBC Urine 8 0 - 5 /HPF H [facility]   RBC URINE 2 0 - 2 /HPF — [facility]   Bacteria Kitty Virgen - has a caregiver    FN:  - IVF: none  - Diet: general    DVT Prophy: SCD  Lines: PIV    Dispo: pending clinical course    Outpatient records or previous hospital records reviewed. Further recommendations pending patient's clinical course.   [facility] hydrochlorothiazide 25 MG Oral Tab Take 25 mg by mouth 3 (three) times a week. Take three times a week --MOWEFR  Disp:  Rfl:    GuaiFENesin (MUCINEX OR) Take 1 tablet by mouth as needed.    Disp:  Rfl:    Acetaminophen (TYLENOL OR) Taking 4-6 tabs daily Dis Electronically signed by Gwendolyn Jorge MD on 8/2/2019  5:00 PM   Attribution [employer]    [facility]. 1 - Gwendolyn Jorge MD on 8/2/2019  4:54 PM  GV. 2 - Gwendolyn Jorge MD on 8/2/2019  4:55 PM                        Consults - MD Consult Notes      Consults s 97%   BMI 18.69 kg/m²   Physical Exam:   General: alert, cooperative, no respiratory distress. Head: Normocephalic, without obvious abnormality, atraumatic.    Throat: + bleeding gums   Lungs: decreased bs bilaterally    Heart: Regular rate and rhythm, no Filed:  8/13/2019  2:40 PM Date of Service:  8/13/2019 10:30 AM Status:  Signed    :  Moiz Nelson PT (Physical Therapist)       PHYSICAL THERAPY TREATMENT NOTE - INPATIENT     Room Number: 468/468-A[TL.1]       Presenting Problem: GI bleed, IT Rating: Unable to rate  Location: c/o back pain with movement  Management Techniques: Activity promotion; Body mechanics;Repositioning[TL.2]    BALANCE[TL.1] Goal #3 Patient is able to ambulate 100 feet with assist device: walker - rolling at assistance level: modified independent   Goal #3   Current Status  Min A x 1 with RW 6ft   Goal #4 Patient will negotiate 4 stairs/one curb w/ assistive device and supervi The patient's[SF.1] Approx Degree of Impairment: 42.8%[SF.2] has been calculated based on documentation in the [facility] '6 clicks' Inpatient Daily Activity Short Form.   Research supports that patients with this level of impairment may benefit from [place]/24 hour Static Sitting:CGA    FUNCTIONAL ADL ASSESSMENT  Grooming: set up from supported sitting  Feeding: set up  Upper Extremity Dressing: min A  Lower Extremity Dressing: min A[SF.1]    Patient End of Session: Up in chair;Needs met;Call light within reach[SF.2] RHO(D) Immune Globulin 08/08/19     RHO(D) Immune Globulin 08/06/19     RHO(D) Immune Globulin 08/06/19     RHO(D) Immune Globulin 08/06/19     RHO(D) Immune Globulin 08/06/19     RHO(D) Immune Globulin 08/06/19     RHO(D) Immune Globulin 08/05/19     RHO